# Patient Record
Sex: MALE | Race: WHITE | NOT HISPANIC OR LATINO | Employment: OTHER | URBAN - METROPOLITAN AREA
[De-identification: names, ages, dates, MRNs, and addresses within clinical notes are randomized per-mention and may not be internally consistent; named-entity substitution may affect disease eponyms.]

---

## 2017-02-01 PROCEDURE — 88305 TISSUE EXAM BY PATHOLOGIST: CPT | Performed by: INTERNAL MEDICINE

## 2017-02-02 ENCOUNTER — LAB REQUISITION (OUTPATIENT)
Dept: LAB | Facility: HOSPITAL | Age: 67
End: 2017-02-02
Payer: MEDICARE

## 2017-02-02 DIAGNOSIS — K20.0 EOSINOPHILIC ESOPHAGITIS: ICD-10-CM

## 2018-01-15 ENCOUNTER — ALLSCRIPTS OFFICE VISIT (OUTPATIENT)
Dept: OTHER | Facility: OTHER | Age: 68
End: 2018-01-15

## 2018-01-15 LAB
CLARITY UR: NORMAL
COLOR UR: NORMAL
GLUCOSE (HISTORICAL): NORMAL
HGB UR QL STRIP.AUTO: NORMAL
KETONES UR STRIP-MCNC: NORMAL MG/DL
LEUKOCYTE ESTERASE UR QL STRIP: NORMAL
NITRITE UR QL STRIP: NORMAL
PH UR STRIP.AUTO: 5 [PH]
PROT UR STRIP-MCNC: NORMAL MG/DL

## 2018-02-19 ENCOUNTER — OFFICE VISIT (OUTPATIENT)
Dept: UROLOGY | Facility: CLINIC | Age: 68
End: 2018-02-19
Payer: MEDICARE

## 2018-02-19 DIAGNOSIS — N13.8 BPH WITH OBSTRUCTION/LOWER URINARY TRACT SYMPTOMS: Primary | ICD-10-CM

## 2018-02-19 DIAGNOSIS — N40.1 BPH WITH OBSTRUCTION/LOWER URINARY TRACT SYMPTOMS: Primary | ICD-10-CM

## 2018-02-19 PROCEDURE — 51741 ELECTRO-UROFLOWMETRY FIRST: CPT | Performed by: PHYSICIAN ASSISTANT

## 2018-02-19 PROCEDURE — 51798 US URINE CAPACITY MEASURE: CPT | Performed by: PHYSICIAN ASSISTANT

## 2018-02-19 PROCEDURE — 99212 OFFICE O/P EST SF 10 MIN: CPT | Performed by: PHYSICIAN ASSISTANT

## 2018-02-19 NOTE — PROGRESS NOTES
UROFLOW REVIEW    MAXIMUM FLOW: 32  AVERAGE FLOW:9  VOIDED VOLUME:374  POST VOID RESIDUAL: 54    RECOMMENDATIONS:  Surveillance/ trial of alpha blocker/ cysto and consider Urolift or TURP  Prefers to follow up next year and will consider options

## 2018-05-14 PROCEDURE — 88305 TISSUE EXAM BY PATHOLOGIST: CPT | Performed by: PATHOLOGY

## 2018-05-15 ENCOUNTER — LAB REQUISITION (OUTPATIENT)
Dept: LAB | Facility: HOSPITAL | Age: 68
End: 2018-05-15
Payer: MEDICARE

## 2018-05-15 DIAGNOSIS — R13.19 OTHER DYSPHAGIA: ICD-10-CM

## 2018-05-15 DIAGNOSIS — K22.70 BARRETT'S ESOPHAGUS WITHOUT DYSPLASIA: ICD-10-CM

## 2018-05-15 DIAGNOSIS — K20.0 EOSINOPHILIC ESOPHAGITIS: ICD-10-CM

## 2018-09-05 ENCOUNTER — OFFICE VISIT (OUTPATIENT)
Dept: UROLOGY | Facility: CLINIC | Age: 68
End: 2018-09-05
Payer: MEDICARE

## 2018-09-05 ENCOUNTER — TELEPHONE (OUTPATIENT)
Dept: UROLOGY | Facility: CLINIC | Age: 68
End: 2018-09-05

## 2018-09-05 VITALS
HEART RATE: 50 BPM | BODY MASS INDEX: 27.06 KG/M2 | HEIGHT: 70 IN | DIASTOLIC BLOOD PRESSURE: 68 MMHG | WEIGHT: 189 LBS | SYSTOLIC BLOOD PRESSURE: 118 MMHG

## 2018-09-05 DIAGNOSIS — N13.8 BPH WITH OBSTRUCTION/LOWER URINARY TRACT SYMPTOMS: ICD-10-CM

## 2018-09-05 DIAGNOSIS — R35.0 URINARY FREQUENCY: Primary | ICD-10-CM

## 2018-09-05 DIAGNOSIS — N40.1 BPH WITH OBSTRUCTION/LOWER URINARY TRACT SYMPTOMS: ICD-10-CM

## 2018-09-05 LAB
SL AMB  POCT GLUCOSE, UA: NORMAL
SL AMB LEUKOCYTE ESTERASE,UA: NORMAL
SL AMB POCT BILIRUBIN,UA: NORMAL
SL AMB POCT BLOOD,UA: NORMAL
SL AMB POCT CLARITY,UA: CLEAR
SL AMB POCT COLOR,UA: YELLOW
SL AMB POCT KETONES,UA: NORMAL
SL AMB POCT NITRITE,UA: NORMAL
SL AMB POCT PH,UA: 5
SL AMB POCT SPECIFIC GRAVITY,UA: 1.02
SL AMB POCT URINE PROTEIN: NORMAL
SL AMB POCT UROBILINOGEN: NORMAL

## 2018-09-05 PROCEDURE — 99213 OFFICE O/P EST LOW 20 MIN: CPT | Performed by: PHYSICIAN ASSISTANT

## 2018-09-05 PROCEDURE — 81002 URINALYSIS NONAUTO W/O SCOPE: CPT | Performed by: PHYSICIAN ASSISTANT

## 2018-09-05 RX ORDER — NIACIN 1000 MG/1
1 TABLET, EXTENDED RELEASE ORAL DAILY
COMMUNITY
Start: 2018-07-31 | End: 2019-02-19

## 2018-09-05 RX ORDER — CLINDAMYCIN HYDROCHLORIDE 300 MG/1
CAPSULE ORAL
COMMUNITY
Start: 2015-03-21 | End: 2019-02-19

## 2018-09-05 RX ORDER — DIPHENOXYLATE HYDROCHLORIDE AND ATROPINE SULFATE 2.5; .025 MG/1; MG/1
1 TABLET ORAL
COMMUNITY

## 2018-09-05 RX ORDER — FERROUS SULFATE 325(65) MG
TABLET ORAL
Refills: 2 | COMMUNITY
Start: 2018-06-27 | End: 2019-02-19

## 2018-09-05 RX ORDER — RAMIPRIL 5 MG/1
5 CAPSULE ORAL
COMMUNITY
Start: 2018-08-06

## 2018-09-05 RX ORDER — PINDOLOL 5 MG/1
5 TABLET ORAL
COMMUNITY
Start: 2018-07-22

## 2018-09-05 RX ORDER — MAG HYDROX/ALUMINUM HYD/SIMETH 400-400-40
1 SUSPENSION, ORAL (FINAL DOSE FORM) ORAL
COMMUNITY

## 2018-09-05 RX ORDER — ASPIRIN 81 MG/1
81 TABLET ORAL
COMMUNITY

## 2018-09-05 RX ORDER — OMEPRAZOLE 40 MG/1
40 CAPSULE, DELAYED RELEASE ORAL
COMMUNITY
Start: 2018-06-14

## 2018-09-05 RX ORDER — ALFUZOSIN HYDROCHLORIDE 10 MG/1
10 TABLET, EXTENDED RELEASE ORAL DAILY
Qty: 30 TABLET | Refills: 10 | Status: SHIPPED | OUTPATIENT
Start: 2018-09-05 | End: 2019-01-19

## 2018-09-05 RX ORDER — DIGOXIN 125 UG/1
1 TABLET ORAL
COMMUNITY
Start: 2018-07-22

## 2018-09-05 RX ORDER — ACETAMINOPHEN 500 MG
500 TABLET ORAL EVERY 6 HOURS PRN
COMMUNITY
Start: 2017-11-07

## 2018-09-05 NOTE — PROGRESS NOTES
UROLOGY PROGRESS NOTE   Patient Identifiers: Sesar Winn (MRN [id number])  Date of Service: 9/5/2018    Subjective:     58-year-old male with history of BPH  He had a uroflow in February and options of management were discussed at that time he chose surveillance until his yearly appointment in January  He returns today complaining of urinary frequency  AUA index score is 19  His PVR after his uroflow was 54 mL  He wishes to initiate treatment  Again options of management reviewed he chooses to go on medication at this time and will consider cystoscopy if the alpha blocker is not effective  He has a follow-up appointment in January for his yearly exam     Patient has  no complaints  Objective:     VITALS:    Vitals:    09/05/18 1356   BP: 118/68   Pulse: (!) 50     AUA SYMPTOM SCORE      Most Recent Value   AUA SYMPTOM SCORE   How often have you had a sensation of not emptying your bladder completely after you finished urinating? 2   How often have you had to urinate again less than two hours after you finished urinating? 3   How often have you found you stopped and started again several times when you urinate? 4   How often have you found it difficult to postpone urination? 3   How often have you had a weak urinary stream?  3   How often have you had to push or strain to begin urination? 2   How many times did you most typically get up to urinate from the time you went to bed at night until the time you got up in the morning?   2   Quality of Life: If you were to spend the rest of your life with your urinary condition just the way it is now, how would you feel about that?  3   AUA SYMPTOM SCORE  19            LABS:  No results found for: HGB, HCT, WBC, PLT]    No results found for: NA, K, CL, CO2, BUN, CREATININE, CALCIUM, GLUCOSE]        INPATIENT MEDS:    Current Outpatient Prescriptions:     acetaminophen (TYLENOL) 500 mg tablet, Take 500 mg by mouth every 6 (six) hours, Disp: , Rfl:     aspirin (ECOTRIN LOW STRENGTH) 81 mg EC tablet, Take 81 mg by mouth daily  , Disp: , Rfl:     Cholecalciferol 2000 units CAPS, Take 1 capsule by mouth daily, Disp: , Rfl:     clindamycin (CLEOCIN) 300 MG capsule, Take by mouth 2 before dentist appt, Disp: , Rfl:     COENZYME Q10-RED YEAST RICE PO, Take 1 tablet by mouth daily, Disp: , Rfl:     DIGOX 125 MCG tablet, Take 1 tablet by mouth daily, Disp: , Rfl:     ferrous sulfate 325 (65 Fe) mg tablet, TAKE 1 TABLET BY MOUTH TWICE A DAY 30 MINUTES PRIOR TO LUNCH AND DINNER WITH VIT  C, Disp: , Rfl: 2    fluticasone-salmeterol (ADVAIR DISKUS) 500-50 mcg/dose inhaler, as needed, Disp: , Rfl:     FOLIC ACID PO, Take 473 mcg by mouth, Disp: , Rfl:     multivitamin (THERAGRAN) TABS, Take 1 tablet by mouth, Disp: , Rfl:     niacin (NIASPAN) 1000 MG CR tablet, Take 1 tablet by mouth daily, Disp: , Rfl:     omeprazole (PriLOSEC) 40 MG capsule, 1 po qd, Disp: , Rfl:     pindolol (VISKEN) 5 mg tablet, 1 po qam, Disp: , Rfl:     ramipril (ALTACE) 5 mg capsule, 1 po qhs, Disp: , Rfl:     Saw [place] 450 MG CAPS, Take by mouth 1 po qd, Disp: , Rfl:     alfuzosin (UROXATRAL) 10 mg 24 hr tablet, Take 1 tablet (10 mg total) by mouth daily, Disp: 30 tablet, Rfl: 10      Physical Exam:   /68 (Patient Position: Sitting, Cuff Size: Adult)   Pulse (!) 50   Ht 5' 10" (1 778 m)   Wt 85 7 kg (189 lb)   BMI 27 12 kg/m²   GEN: no acute distress    RESP: breathing comfortably with no accessory muscle use    ABD: soft, non-tender, non-distended   INCISION:    EXT: no significant peripheral edema     RADIOLOGY:     None     Assessment:    1  BPH with obstruction     Plan:   - trial of alfuzosin 10 mg daily  - follow-up for his annual exam in January  - he should call sooner if is medication is not effective or he develops any side effects  - cystoscopy Uroliftor TURP are still on the table

## 2019-01-19 ENCOUNTER — OFFICE VISIT (OUTPATIENT)
Dept: URGENT CARE | Age: 69
End: 2019-01-19
Payer: COMMERCIAL

## 2019-01-19 VITALS
HEART RATE: 55 BPM | RESPIRATION RATE: 18 BRPM | WEIGHT: 189 LBS | OXYGEN SATURATION: 99 % | HEIGHT: 70 IN | BODY MASS INDEX: 27.06 KG/M2 | SYSTOLIC BLOOD PRESSURE: 134 MMHG | DIASTOLIC BLOOD PRESSURE: 77 MMHG | TEMPERATURE: 98.3 F

## 2019-01-19 DIAGNOSIS — J32.9 SINOBRONCHITIS: Primary | ICD-10-CM

## 2019-01-19 DIAGNOSIS — J40 SINOBRONCHITIS: Primary | ICD-10-CM

## 2019-01-19 PROCEDURE — 99213 OFFICE O/P EST LOW 20 MIN: CPT | Performed by: FAMILY MEDICINE

## 2019-01-19 PROCEDURE — S9083 URGENT CARE CENTER GLOBAL: HCPCS | Performed by: FAMILY MEDICINE

## 2019-01-19 RX ORDER — ALFUZOSIN HYDROCHLORIDE 10 MG/1
TABLET, EXTENDED RELEASE ORAL
COMMUNITY
End: 2019-11-17 | Stop reason: SDUPTHER

## 2019-01-19 RX ORDER — DOXYCYCLINE 100 MG/1
100 CAPSULE ORAL 2 TIMES DAILY
Qty: 20 CAPSULE | Refills: 0 | Status: SHIPPED | OUTPATIENT
Start: 2019-01-19 | End: 2019-01-29

## 2019-01-19 RX ORDER — ROSUVASTATIN CALCIUM 5 MG/1
TABLET, COATED ORAL
COMMUNITY

## 2019-01-19 NOTE — PROGRESS NOTES
[facility]    NAME: Tammie Streeter is a 77 y o  male  : 1950    MRN: [id number]  DATE: 2019  TIME: 5:56 PM    Assessment and Plan   Sinobronchitis [J32 9, J40]  1  Sinobronchitis  doxycycline monohydrate (MONODOX) 100 mg capsule       Patient Instructions     Patient Instructions   Take antibiotic as directed  Use your Advair Diskus as directed  Continue nasal saline rinses  Vaporizer in bedroom  Follow up with family doctor if not improving over the next 3-5 days  Chief Complaint     Chief Complaint   Patient presents with    Cough     cold sx, yellow green colored mucous  no fevers or chills  took OTC cough and flu meds  took tylenol this am around 8 am         History of Present Illness   Tammie Streeter presents to the clinic c/o  75-year-old male comes in with increased cough  Started last Thursday with some malaise and then cold symptoms  Now his mucus is yellow and green and sticky  History of pneumonia 2 years ago  Just does not feel well  Mild malaise  Up-to-date with influenza and pneumococcal vaccines  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  Did not wear any respiratory protection  Review of Systems   Review of Systems   Constitutional: Positive for fatigue and fever  Negative for chills  HENT: Positive for congestion, postnasal drip, rhinorrhea and sinus pressure  Eyes: Negative  Respiratory: Positive for cough and chest tightness  Negative for shortness of breath  Cardiovascular: Negative  Hematological: Bruises/bleeds easily         Current Medications     Long-Term Prescriptions   Medication Sig Dispense Refill    aspirin (ECOTRIN LOW STRENGTH) 81 mg EC tablet Take 81 mg by mouth daily        Cholecalciferol 2000 units CAPS Take 1 capsule by mouth daily      DIGOX 125 MCG tablet Take 1 tablet by mouth daily      ferrous sulfate 325 (65 Fe) mg tablet TAKE 1 TABLET BY MOUTH TWICE A DAY 30 MINUTES PRIOR TO LUNCH AND DINNER WITH VIT  C  2    fluticasone-salmeterol (ADVAIR DISKUS) 500-50 mcg/dose inhaler as needed      multivitamin (THERAGRAN) TABS Take 1 tablet by mouth      niacin (NIASPAN) 1000 MG CR tablet Take 1 tablet by mouth daily      omeprazole (PriLOSEC) 40 MG capsule 1 po qd      pindolol (VISKEN) 5 mg tablet 1 po qam      ramipril (ALTACE) 5 mg capsule 1 po qhs      rosuvastatin (CRESTOR) 5 mg tablet TAKE 1 TABLET BY MOUTH EVERYDAY AT BEDTIME         Current Allergies     Allergies as of 01/19/2019 - Reviewed 01/19/2019   Allergen Reaction Noted    Aminobenzoate  10/16/2017    Penicillins Rash 03/21/2015    Shellfish-derived products Rash 01/15/2018          The following portions of the patient's history were reviewed and updated as appropriate: allergies, current medications, past family history, past medical history, past social history, past surgical history and problem list   Past Medical History:   Diagnosis Date    Hypertension     Nocturnal hypoxia     PAT [facility]paroxysmal atrial tachycardia) ([facility] [address] )     Pneumonia 2016    PVC (premature ventricular contraction)      Past Surgical History:   Procedure Laterality Date    CARDIAC SURGERY  1996    CORONARY ARTERY BYPASS GRAFT      FOOT SURGERY Left 2001    TOTAL HIP ARTHROPLASTY Right 11/2017     Family History   Problem Relation Age of Onset    Heart disease Mother     Hypertension Mother    Alejandra Mejia Other Son         Leiomyosarcoma       Objective   /77 (BP Location: Left arm)   Pulse 55   Temp 98 3 °F (36 8 °C) (Oral)   Resp 18   Ht 5' 10" (1 778 m)   Wt 85 7 kg (189 lb)   SpO2 99%   BMI 27 12 kg/m²   No LMP for male patient  Physical Exam     Physical Exam   Constitutional: He is oriented to person, place, and time  He appears well-developed and well-nourished  No distress  HENT:   Head: Normocephalic and atraumatic     Right Ear: External ear normal    Left Ear: External ear normal    Mouth/Throat: No oropharyngeal exudate  Nasal turbinates red and edematous  Deviated nasal septum  Cobblestoning posterior pharynx   Eyes: Pupils are equal, round, and reactive to light  Conjunctivae are normal  Right eye exhibits no discharge  Left eye exhibits no discharge  No scleral icterus  Neck: Normal range of motion  Neck supple  Cardiovascular: Normal rate, regular rhythm and normal heart sounds  Exam reveals no gallop and no friction rub  No murmur heard  Pulmonary/Chest: Effort normal  No respiratory distress  He has no wheezes  He has no rales  Sonorous rhonchi right lung field   Lymphadenopathy:     He has no cervical adenopathy  Neurological: He is alert and oriented to person, place, and time  Skin: Skin is warm and dry  He is not diaphoretic  Psychiatric: He has a normal mood and affect  Nursing note and vitals reviewed

## 2019-01-19 NOTE — PATIENT INSTRUCTIONS
Take antibiotic as directed  Use your Advair Diskus as directed  Continue nasal saline rinses  Vaporizer in bedroom  Follow up with family doctor if not improving over the next 3-5 days

## 2019-01-28 ENCOUNTER — OFFICE VISIT (OUTPATIENT)
Dept: UROLOGY | Facility: CLINIC | Age: 69
End: 2019-01-28
Payer: COMMERCIAL

## 2019-01-28 VITALS
SYSTOLIC BLOOD PRESSURE: 124 MMHG | HEART RATE: 50 BPM | DIASTOLIC BLOOD PRESSURE: 68 MMHG | HEIGHT: 70 IN | BODY MASS INDEX: 27.77 KG/M2 | WEIGHT: 194 LBS

## 2019-01-28 DIAGNOSIS — R35.0 URINARY FREQUENCY: ICD-10-CM

## 2019-01-28 DIAGNOSIS — N13.8 BPH WITH OBSTRUCTION/LOWER URINARY TRACT SYMPTOMS: Primary | ICD-10-CM

## 2019-01-28 DIAGNOSIS — N40.1 BPH WITH OBSTRUCTION/LOWER URINARY TRACT SYMPTOMS: Primary | ICD-10-CM

## 2019-01-28 LAB
SL AMB  POCT GLUCOSE, UA: NORMAL
SL AMB LEUKOCYTE ESTERASE,UA: NORMAL
SL AMB POCT BILIRUBIN,UA: NORMAL
SL AMB POCT BLOOD,UA: NORMAL
SL AMB POCT CLARITY,UA: CLEAR
SL AMB POCT COLOR,UA: YELLOW
SL AMB POCT KETONES,UA: NORMAL
SL AMB POCT NITRITE,UA: NORMAL
SL AMB POCT PH,UA: 5
SL AMB POCT SPECIFIC GRAVITY,UA: 1.01
SL AMB POCT URINE PROTEIN: NORMAL
SL AMB POCT UROBILINOGEN: NORMAL

## 2019-01-28 PROCEDURE — 99213 OFFICE O/P EST LOW 20 MIN: CPT | Performed by: PHYSICIAN ASSISTANT

## 2019-01-28 PROCEDURE — 81002 URINALYSIS NONAUTO W/O SCOPE: CPT | Performed by: PHYSICIAN ASSISTANT

## 2019-01-28 RX ORDER — ALFUZOSIN HYDROCHLORIDE 10 MG/1
10 TABLET, EXTENDED RELEASE ORAL DAILY
Qty: 90 TABLET | Refills: 3 | Status: SHIPPED | OUTPATIENT
Start: 2019-01-28 | End: 2019-02-19

## 2019-02-04 ENCOUNTER — TRANSCRIBE ORDERS (OUTPATIENT)
Dept: ADMINISTRATIVE | Age: 69
End: 2019-02-04

## 2019-02-04 ENCOUNTER — LAB (OUTPATIENT)
Dept: LAB | Age: 69
End: 2019-02-04
Payer: COMMERCIAL

## 2019-02-04 DIAGNOSIS — K42.9 UMBILICAL HERNIA WITHOUT OBSTRUCTION OR GANGRENE: Primary | ICD-10-CM

## 2019-02-04 DIAGNOSIS — K42.9 UMBILICAL HERNIA WITHOUT OBSTRUCTION OR GANGRENE: ICD-10-CM

## 2019-02-04 LAB
ALBUMIN SERPL BCP-MCNC: 3.7 G/DL (ref 3.5–5)
ALP SERPL-CCNC: 82 U/L (ref 46–116)
ALT SERPL W P-5'-P-CCNC: 29 U/L (ref 12–78)
ANION GAP SERPL CALCULATED.3IONS-SCNC: 2 MMOL/L (ref 4–13)
AST SERPL W P-5'-P-CCNC: 17 U/L (ref 5–45)
BASOPHILS # BLD AUTO: 0.02 THOUSANDS/ΜL (ref 0–0.1)
BASOPHILS NFR BLD AUTO: 0 % (ref 0–1)
BILIRUB SERPL-MCNC: 0.47 MG/DL (ref 0.2–1)
BUN SERPL-MCNC: 13 MG/DL (ref 5–25)
CALCIUM SERPL-MCNC: 8.6 MG/DL (ref 8.3–10.1)
CHLORIDE SERPL-SCNC: 105 MMOL/L (ref 100–108)
CO2 SERPL-SCNC: 32 MMOL/L (ref 21–32)
CREAT SERPL-MCNC: 1.04 MG/DL (ref 0.6–1.3)
EOSINOPHIL # BLD AUTO: 0.15 THOUSAND/ΜL (ref 0–0.61)
EOSINOPHIL NFR BLD AUTO: 3 % (ref 0–6)
ERYTHROCYTE [DISTWIDTH] IN BLOOD BY AUTOMATED COUNT: 13.6 % (ref 11.6–15.1)
GFR SERPL CREATININE-BSD FRML MDRD: 72 ML/MIN/1.73SQ M
GLUCOSE SERPL-MCNC: 87 MG/DL (ref 65–140)
HCT VFR BLD AUTO: 42.4 % (ref 36.5–49.3)
HGB BLD-MCNC: 13.7 G/DL (ref 12–17)
IMM GRANULOCYTES # BLD AUTO: 0.01 THOUSAND/UL (ref 0–0.2)
IMM GRANULOCYTES NFR BLD AUTO: 0 % (ref 0–2)
LYMPHOCYTES # BLD AUTO: 1.73 THOUSANDS/ΜL (ref 0.6–4.47)
LYMPHOCYTES NFR BLD AUTO: 30 % (ref 14–44)
MCH RBC QN AUTO: 30.7 PG (ref 26.8–34.3)
MCHC RBC AUTO-ENTMCNC: 32.3 G/DL (ref 31.4–37.4)
MCV RBC AUTO: 95 FL (ref 82–98)
MONOCYTES # BLD AUTO: 0.68 THOUSAND/ΜL (ref 0.17–1.22)
MONOCYTES NFR BLD AUTO: 12 % (ref 4–12)
NEUTROPHILS # BLD AUTO: 3.26 THOUSANDS/ΜL (ref 1.85–7.62)
NEUTS SEG NFR BLD AUTO: 55 % (ref 43–75)
NRBC BLD AUTO-RTO: 0 /100 WBCS
PLATELET # BLD AUTO: 198 THOUSANDS/UL (ref 149–390)
PMV BLD AUTO: 9.5 FL (ref 8.9–12.7)
POTASSIUM SERPL-SCNC: 4.1 MMOL/L (ref 3.5–5.3)
PROT SERPL-MCNC: 7 G/DL (ref 6.4–8.2)
RBC # BLD AUTO: 4.46 MILLION/UL (ref 3.88–5.62)
SODIUM SERPL-SCNC: 139 MMOL/L (ref 136–145)
WBC # BLD AUTO: 5.85 THOUSAND/UL (ref 4.31–10.16)

## 2019-02-04 PROCEDURE — 36415 COLL VENOUS BLD VENIPUNCTURE: CPT

## 2019-02-04 PROCEDURE — 80053 COMPREHEN METABOLIC PANEL: CPT

## 2019-02-04 PROCEDURE — 85025 COMPLETE CBC W/AUTO DIFF WBC: CPT

## 2019-02-04 PROCEDURE — 93005 ELECTROCARDIOGRAM TRACING: CPT

## 2019-02-05 LAB
ATRIAL RATE: 49 BPM
P AXIS: 63 DEGREES
PR INTERVAL: 184 MS
QRS AXIS: 32 DEGREES
QRSD INTERVAL: 88 MS
QT INTERVAL: 444 MS
QTC INTERVAL: 401 MS
T WAVE AXIS: 37 DEGREES
VENTRICULAR RATE: 49 BPM

## 2019-02-05 PROCEDURE — 93010 ELECTROCARDIOGRAM REPORT: CPT | Performed by: INTERNAL MEDICINE

## 2019-02-19 NOTE — PRE-PROCEDURE INSTRUCTIONS
Pre-Surgery Instructions:   Medication Instructions    acetaminophen (TYLENOL) 500 mg tablet Instructed patient per Anesthesia Guidelines   alfuzosin (UROXATRAL) 10 mg 24 hr tablet Instructed patient per Anesthesia Guidelines   aspirin (ECOTRIN LOW STRENGTH) 81 mg EC tablet Patient was instructed by Physician and understands   Cholecalciferol 2000 units CAPS Instructed patient per Anesthesia Guidelines   Coenzyme Q10 (CO Q10 PO) Instructed patient per Anesthesia Guidelines   DIGOX 125 MCG tablet Instructed patient per Anesthesia Guidelines   fluticasone-salmeterol (ADVAIR DISKUS) 500-50 mcg/dose inhaler Instructed patient per Anesthesia Guidelines   FOLIC ACID PO Instructed patient per Anesthesia Guidelines   multivitamin (THERAGRAN) TABS Instructed patient per Anesthesia Guidelines   omeprazole (PriLOSEC) 40 MG capsule Instructed patient per Anesthesia Guidelines   pindolol (VISKEN) 5 mg tablet Instructed patient per Anesthesia Guidelines   ramipril (ALTACE) 5 mg capsule Instructed patient per Anesthesia Guidelines   rosuvastatin (CRESTOR) 5 mg tablet Instructed patient per Anesthesia Guidelines   Saw [place] 450 MG CAPS Instructed patient per Anesthesia Guidelines      Pre op and showering instructions reviewed-Patient has hibiclens

## 2019-02-22 ENCOUNTER — HOSPITAL ENCOUNTER (OUTPATIENT)
Facility: HOSPITAL | Age: 69
Setting detail: OUTPATIENT SURGERY
Discharge: HOME | End: 2019-02-22
Attending: SURGERY | Admitting: SURGERY
Payer: COMMERCIAL

## 2019-02-22 ENCOUNTER — ANESTHESIA (OUTPATIENT)
Dept: PERIOP | Facility: HOSPITAL | Age: 69
End: 2019-02-22
Payer: COMMERCIAL

## 2019-02-22 ENCOUNTER — ANESTHESIA EVENT (OUTPATIENT)
Dept: PERIOP | Facility: HOSPITAL | Age: 69
End: 2019-02-22
Payer: COMMERCIAL

## 2019-02-22 VITALS
OXYGEN SATURATION: 96 % | HEIGHT: 70 IN | TEMPERATURE: 97.4 F | HEART RATE: 54 BPM | SYSTOLIC BLOOD PRESSURE: 155 MMHG | DIASTOLIC BLOOD PRESSURE: 76 MMHG | BODY MASS INDEX: 27.2 KG/M2 | WEIGHT: 190 LBS | RESPIRATION RATE: 18 BRPM

## 2019-02-22 PROCEDURE — C1781 MESH (IMPLANTABLE): HCPCS | Performed by: SURGERY

## 2019-02-22 DEVICE — MODIFIED ONFLEX MESH
Type: IMPLANTABLE DEVICE | Site: INGUINAL | Status: FUNCTIONAL
Brand: MODIFIED ONFLEX MESH

## 2019-02-22 RX ORDER — LIDOCAINE HYDROCHLORIDE 10 MG/ML
INJECTION, SOLUTION INFILTRATION; PERINEURAL AS NEEDED
Status: DISCONTINUED | OUTPATIENT
Start: 2019-02-22 | End: 2019-02-22 | Stop reason: SURG

## 2019-02-22 RX ORDER — CLINDAMYCIN PHOSPHATE 900 MG/50ML
900 INJECTION INTRAVENOUS ONCE
Status: COMPLETED | OUTPATIENT
Start: 2019-02-22 | End: 2019-02-22

## 2019-02-22 RX ORDER — MAGNESIUM HYDROXIDE 1200 MG/15ML
LIQUID ORAL AS NEEDED
Status: DISCONTINUED | OUTPATIENT
Start: 2019-02-22 | End: 2019-02-22 | Stop reason: HOSPADM

## 2019-02-22 RX ORDER — FENTANYL CITRATE 50 UG/ML
INJECTION, SOLUTION INTRAMUSCULAR; INTRAVENOUS AS NEEDED
Status: DISCONTINUED | OUTPATIENT
Start: 2019-02-22 | End: 2019-02-22 | Stop reason: SURG

## 2019-02-22 RX ORDER — MIDAZOLAM HYDROCHLORIDE 1 MG/ML
INJECTION INTRAMUSCULAR; INTRAVENOUS AS NEEDED
Status: DISCONTINUED | OUTPATIENT
Start: 2019-02-22 | End: 2019-02-22 | Stop reason: SURG

## 2019-02-22 RX ORDER — KETOROLAC TROMETHAMINE 30 MG/ML
INJECTION, SOLUTION INTRAMUSCULAR; INTRAVENOUS AS NEEDED
Status: DISCONTINUED | OUTPATIENT
Start: 2019-02-22 | End: 2019-02-22 | Stop reason: SURG

## 2019-02-22 RX ORDER — ONDANSETRON 2 MG/ML
INJECTION INTRAMUSCULAR; INTRAVENOUS AS NEEDED
Status: DISCONTINUED | OUTPATIENT
Start: 2019-02-22 | End: 2019-02-22 | Stop reason: SURG

## 2019-02-22 RX ORDER — FENTANYL CITRATE/PF 50 MCG/ML
25 SYRINGE (ML) INJECTION
Status: DISCONTINUED | OUTPATIENT
Start: 2019-02-22 | End: 2019-02-22 | Stop reason: HOSPADM

## 2019-02-22 RX ORDER — SODIUM CHLORIDE, SODIUM LACTATE, POTASSIUM CHLORIDE, CALCIUM CHLORIDE 600; 310; 30; 20 MG/100ML; MG/100ML; MG/100ML; MG/100ML
50 INJECTION, SOLUTION INTRAVENOUS CONTINUOUS
Status: DISCONTINUED | OUTPATIENT
Start: 2019-02-22 | End: 2019-02-22 | Stop reason: HOSPADM

## 2019-02-22 RX ORDER — ONDANSETRON 2 MG/ML
4 INJECTION INTRAMUSCULAR; INTRAVENOUS ONCE AS NEEDED
Status: DISCONTINUED | OUTPATIENT
Start: 2019-02-22 | End: 2019-02-22 | Stop reason: HOSPADM

## 2019-02-22 RX ORDER — EPHEDRINE SULFATE 50 MG/ML
INJECTION, SOLUTION INTRAVENOUS AS NEEDED
Status: DISCONTINUED | OUTPATIENT
Start: 2019-02-22 | End: 2019-02-22 | Stop reason: SURG

## 2019-02-22 RX ORDER — BUPIVACAINE HYDROCHLORIDE AND EPINEPHRINE 2.5; 5 MG/ML; UG/ML
INJECTION, SOLUTION INFILTRATION; PERINEURAL AS NEEDED
Status: DISCONTINUED | OUTPATIENT
Start: 2019-02-22 | End: 2019-02-22 | Stop reason: HOSPADM

## 2019-02-22 RX ORDER — SODIUM CHLORIDE 9 MG/ML
INJECTION, SOLUTION INTRAVENOUS CONTINUOUS PRN
Status: DISCONTINUED | OUTPATIENT
Start: 2019-02-22 | End: 2019-02-22 | Stop reason: SURG

## 2019-02-22 RX ORDER — PROPOFOL 10 MG/ML
INJECTION, EMULSION INTRAVENOUS AS NEEDED
Status: DISCONTINUED | OUTPATIENT
Start: 2019-02-22 | End: 2019-02-22 | Stop reason: SURG

## 2019-02-22 RX ADMIN — SODIUM CHLORIDE: 0.9 INJECTION, SOLUTION INTRAVENOUS at 15:08

## 2019-02-22 RX ADMIN — FENTANYL CITRATE 50 MCG: 50 INJECTION INTRAMUSCULAR; INTRAVENOUS at 15:19

## 2019-02-22 RX ADMIN — MIDAZOLAM HYDROCHLORIDE 1 MG: 1 INJECTION, SOLUTION INTRAMUSCULAR; INTRAVENOUS at 15:10

## 2019-02-22 RX ADMIN — FENTANYL CITRATE 25 MCG: 50 INJECTION INTRAMUSCULAR; INTRAVENOUS at 15:25

## 2019-02-22 RX ADMIN — LIDOCAINE HYDROCHLORIDE ANHYDROUS 50 MG: 10 INJECTION, SOLUTION INFILTRATION at 15:12

## 2019-02-22 RX ADMIN — KETOROLAC TROMETHAMINE 15 MG: 30 INJECTION, SOLUTION INTRAMUSCULAR at 15:59

## 2019-02-22 RX ADMIN — EPHEDRINE SULFATE 5 MG: 50 INJECTION, SOLUTION INTRAMUSCULAR; INTRAVENOUS; SUBCUTANEOUS at 15:33

## 2019-02-22 RX ADMIN — CLINDAMYCIN IN 5 PERCENT DEXTROSE 900 MG: 18 INJECTION, SOLUTION INTRAVENOUS at 14:55

## 2019-02-22 RX ADMIN — MIDAZOLAM HYDROCHLORIDE 1 MG: 1 INJECTION, SOLUTION INTRAMUSCULAR; INTRAVENOUS at 15:07

## 2019-02-22 RX ADMIN — EPHEDRINE SULFATE 5 MG: 50 INJECTION, SOLUTION INTRAMUSCULAR; INTRAVENOUS; SUBCUTANEOUS at 15:49

## 2019-02-22 RX ADMIN — ONDANSETRON 4 MG: 2 INJECTION INTRAMUSCULAR; INTRAVENOUS at 15:21

## 2019-02-22 RX ADMIN — FENTANYL CITRATE 25 MCG: 50 INJECTION INTRAMUSCULAR; INTRAVENOUS at 15:12

## 2019-02-22 RX ADMIN — PROPOFOL 200 MG: 10 INJECTION, EMULSION INTRAVENOUS at 15:12

## 2019-02-22 RX ADMIN — DEXAMETHASONE SODIUM PHOSPHATE 4 MG: 10 INJECTION INTRAMUSCULAR; INTRAVENOUS at 15:21

## 2019-02-22 RX ADMIN — EPHEDRINE SULFATE 5 MG: 50 INJECTION, SOLUTION INTRAMUSCULAR; INTRAVENOUS; SUBCUTANEOUS at 15:35

## 2019-02-22 NOTE — ANESTHESIA POSTPROCEDURE EVALUATION
Post-Op Assessment Note    CV Status:  Stable  Pain Score: 0    Pain management: adequate     Mental Status:  Alert and awake   Hydration Status:  Euvolemic   PONV Controlled:  Controlled   Airway Patency:  Patent   Post Op Vitals Reviewed: Yes      Staff: CRNA, Anesthesiologist           BP (P) 138/68 (02/22/19 1608)    Temp (P) 98 3 °F (36 8 °C) (02/22/19 1608)    Pulse (P) 63 (02/22/19 1608)   Resp (P) 18 (02/22/19 1608)    SpO2 (P) 98 % (02/22/19 1608)

## 2019-02-22 NOTE — ANESTHESIA PREPROCEDURE EVALUATION
Review of Systems/Medical History  Patient summary reviewed    No history of anesthetic complications     Cardiovascular  EKG reviewed, Exercise tolerance (METS): >4,  Hyperlipidemia, Hypertension controlled, History of CABG,    Pulmonary  Pneumonia,        GI/Hepatic    GERD ,        Negative  ROS        Endo/Other  Negative endo/other ROS      GYN       Hematology  Negative hematology ROS      Musculoskeletal  Negative musculoskeletal ROS        Neurology  Negative neurology ROS      Psychology   Negative psychology ROS              Physical Exam    Airway    Mallampati score: I  TM Distance: >3 FB  Neck ROM: full     Dental   Comment: No loose,     Cardiovascular  Rhythm: regular, Rate: normal,     Pulmonary  Breath sounds clear to auscultation,     Other Findings        Anesthesia Plan  ASA Score- 2     Anesthesia Type- general with ASA Monitors  Additional Monitors:   Airway Plan: LMA  Plan Factors-  Patient did not smoke on day of surgery  Induction-     Postoperative Plan- Plan for postoperative opioid use  Planned trial extubation    Informed Consent- Anesthetic plan and risks discussed with patient  I personally reviewed this patient with the CRNA  Discussed and agreed on the Anesthesia Plan with the CRNA  Sherrie Carroll

## 2019-02-22 NOTE — DISCHARGE INSTRUCTIONS
Diet and activity as tolerated  May shower  May drive when not taking pain medication  Please call [phone] for a follow-up office visit for 2 weeks  Please consider milk a magnesia daily in order to help prevent constipation

## 2019-02-22 NOTE — OP NOTE
OPERATIVE REPORT  PATIENT NAME: Kelly George    :  1950  MRN: [id number]  Pt Location: [facility]    SURGERY DATE: 2019    Surgeon(s) and Role:     * Dalia Rogers MD - Primary     * Nabila Becerra MD - Assisting    Preop Diagnosis:  Unilateral inguinal hernia without obstruction or gangrene [K40 90]    Post-Op Diagnosis Codes:     * Unilateral inguinal hernia without obstruction or gangrene [K40 90]    Procedure(s) (LRB):  REPAIR HERNIA INGUINAL (Left)    Specimen(s):  * No specimens in log *    Estimated Blood Loss:   Minimal    Drains:  * No LDAs found *    Anesthesia Type:   General    Operative Indications:  Unilateral inguinal hernia without obstruction or gangrene [K40 90]      Operative Findings:  Independent, nonsmoker, ASA 2, wound class 1, BMI 27, weight 190, height 70    Cardiovascular  EKG reviewed, Exercise tolerance (METS): >4,  Hyperlipidemia, Hypertension controlled, History of CABG,     Pulmonary  Pneumonia,          GI/Hepatic     GERD ,          Negative  ROS          Endo/Other  Negative endo/other ROS        GYN         Hematology  Negative hematology ROS        Musculoskeletal  Negative musculoskeletal ROS          Neurology  Negative neurology ROS        Psychology   Negative psychology ROS                           Complications:   None    Procedure and Technique:  Patient was identified visually and via armband  Placed in the supine position  After general anesthesia the abdomen was prepped and draped in a sterile fashion  0 25% Marcaine with epinephrine was utilized throughout the procedure  Incision was made in the left inguinal region  This carried down through skin subcutaneous tissue  Superficial epigastric vein was clamped divided and ligated  External oblique fibers were divided  Self-retaining retractor was then placed  A large indirect inguinal herniation was present  High dissection was completed    A Penrose drain was then utilized to encircle the cord structures  These were preserved during dissection  Polypropylene mesh was then inserted into the pre peritoneum  This on a placed interrupted figure-of-eight 1  Vicryl suture  [place] mesh was also secured  The wound was then closed with a running 3 0 PDS for external oblique followed by 3 0 Vicryl subcutaneous and 4 Monocryl sutures  Dermabond was applied  Patient tolerated this procedure well  Sponge instrument count correct     I was present for the entire procedure    Patient Disposition:  PACU     SIGNATURE: Karma Jenkins MD  DATE: February 22, 2019  TIME: 4:05 PM

## 2019-03-26 ENCOUNTER — ANESTHESIA EVENT (OUTPATIENT)
Dept: GASTROENTEROLOGY | Facility: HOSPITAL | Age: 69
End: 2019-03-26
Payer: COMMERCIAL

## 2019-03-27 ENCOUNTER — ANESTHESIA (OUTPATIENT)
Dept: GASTROENTEROLOGY | Facility: HOSPITAL | Age: 69
End: 2019-03-27
Payer: COMMERCIAL

## 2019-03-27 ENCOUNTER — HOSPITAL ENCOUNTER (OUTPATIENT)
Facility: HOSPITAL | Age: 69
Setting detail: OUTPATIENT SURGERY
Discharge: HOME | End: 2019-03-27
Attending: INTERNAL MEDICINE | Admitting: INTERNAL MEDICINE
Payer: COMMERCIAL

## 2019-03-27 VITALS
DIASTOLIC BLOOD PRESSURE: 61 MMHG | HEART RATE: 64 BPM | TEMPERATURE: 97.7 F | WEIGHT: 190 LBS | BODY MASS INDEX: 27.2 KG/M2 | SYSTOLIC BLOOD PRESSURE: 127 MMHG | RESPIRATION RATE: 18 BRPM | OXYGEN SATURATION: 98 % | HEIGHT: 70 IN

## 2019-03-27 DIAGNOSIS — Z86.010 HISTORY OF COLONIC POLYPS: ICD-10-CM

## 2019-03-27 PROCEDURE — 88305 TISSUE EXAM BY PATHOLOGIST: CPT | Performed by: PATHOLOGY

## 2019-03-27 RX ORDER — SODIUM CHLORIDE 9 MG/ML
INJECTION, SOLUTION INTRAVENOUS CONTINUOUS PRN
Status: DISCONTINUED | OUTPATIENT
Start: 2019-03-27 | End: 2019-03-27 | Stop reason: SURG

## 2019-03-27 RX ORDER — PROPOFOL 10 MG/ML
INJECTION, EMULSION INTRAVENOUS CONTINUOUS PRN
Status: DISCONTINUED | OUTPATIENT
Start: 2019-03-27 | End: 2019-03-27 | Stop reason: SURG

## 2019-03-27 RX ORDER — PROPOFOL 10 MG/ML
INJECTION, EMULSION INTRAVENOUS AS NEEDED
Status: DISCONTINUED | OUTPATIENT
Start: 2019-03-27 | End: 2019-03-27 | Stop reason: SURG

## 2019-03-27 RX ADMIN — PROPOFOL 20 MG: 10 INJECTION, EMULSION INTRAVENOUS at 14:17

## 2019-03-27 RX ADMIN — PROPOFOL 20 MG: 10 INJECTION, EMULSION INTRAVENOUS at 14:08

## 2019-03-27 RX ADMIN — PROPOFOL 40 MG: 10 INJECTION, EMULSION INTRAVENOUS at 14:16

## 2019-03-27 RX ADMIN — PROPOFOL 20 MG: 10 INJECTION, EMULSION INTRAVENOUS at 14:03

## 2019-03-27 RX ADMIN — LIDOCAINE HYDROCHLORIDE 50 MG: 20 INJECTION, SOLUTION INTRAVENOUS at 13:58

## 2019-03-27 RX ADMIN — PROPOFOL 120 MCG/KG/MIN: 10 INJECTION, EMULSION INTRAVENOUS at 14:01

## 2019-03-27 RX ADMIN — PROPOFOL 20 MG: 10 INJECTION, EMULSION INTRAVENOUS at 14:13

## 2019-03-27 RX ADMIN — PROPOFOL 100 MG: 10 INJECTION, EMULSION INTRAVENOUS at 14:01

## 2019-03-27 RX ADMIN — SODIUM CHLORIDE: 0.9 INJECTION, SOLUTION INTRAVENOUS at 13:56

## 2019-03-27 RX ADMIN — PROPOFOL 40 MG: 10 INJECTION, EMULSION INTRAVENOUS at 14:22

## 2019-03-27 RX ADMIN — LIDOCAINE HYDROCHLORIDE 50 MG: 20 INJECTION, SOLUTION INTRAVENOUS at 14:01

## 2019-03-27 NOTE — ANESTHESIA PREPROCEDURE EVALUATION
Review of Systems/Medical History  Patient summary reviewed    No history of anesthetic complications     Cardiovascular  EKG reviewed, Exercise tolerance (METS): >4,  Hyperlipidemia, Hypertension controlled, History of CABG,    Pulmonary  Pneumonia,        GI/Hepatic    GERD ,        Negative  ROS        Endo/Other  Negative endo/other ROS      GYN       Hematology  Negative hematology ROS      Musculoskeletal  Negative musculoskeletal ROS        Neurology  Negative neurology ROS      Psychology   Negative psychology ROS              Physical Exam    Airway    Mallampati score: I  TM Distance: >3 FB  Neck ROM: full     Dental   Comment: No loose,     Cardiovascular  Rhythm: regular, Rate: normal,     Pulmonary  Breath sounds clear to auscultation,     Other Findings        Anesthesia Plan  ASA Score- 2     Anesthesia Type- IV sedation with anesthesia with ASA Monitors  Additional Monitors:   Airway Plan:         Plan Factors-Patient not instructed to abstain from smoking on day of procedure  Patient did not smoke on day of surgery  Induction-     Postoperative Plan-   Planned trial extubation    Informed Consent- Anesthetic plan and risks discussed with patient  I personally reviewed this patient with the CRNA  Discussed and agreed on the Anesthesia Plan with the ISABEL Sierra

## 2019-03-27 NOTE — H&P
History and Physical -  [facility]  Guerrero Sanders 77 y o  male MRN: [id number]                  HPI: Guerrero Sanders is a 77y o  year old male who presents for colonoscopy, he has history of multiple colonic adenomas  The last colonoscopy was about 3 years ago  REVIEW OF SYSTEMS: Per the HPI, and otherwise unremarkable      Historical Information   Past Medical History:   Diagnosis Date    Cancer ([facility] [address] )     skin - squamos cell    GERD (gastroesophageal reflux disease)     Hearing aid worn     Bilateral    Hyperlipidemia     Hypertension     Nocturnal hypoxia     Use 2LNC Concentrated O2 at Night only    PAT (paroxysmal atrial tachycardia) (HCC)     Pneumonia 2016    PVC (premature ventricular contraction)      Past Surgical History:   Procedure Laterality Date    CARDIAC SURGERY  1996    CATARACT EXTRACTION      COLONOSCOPY      CORONARY ARTERY BYPASS GRAFT      CYST REMOVAL      Chest    FOOT SURGERY Left 2001    KNEE SURGERY Left     as a child    [place] REPAIR [address] HERNIA,5+Y/O,REDUCIBL Left 2/22/2019    Procedure: REPAIR HERNIA INGUINAL;  Surgeon: Nicole Stubbs MD;  Location: [facility];  Service: General    TONSILLECTOMY      TOTAL HIP ARTHROPLASTY Right 11/2017     Social History   Social History     Substance and Sexual Activity   Alcohol Use No     Social History     Substance and Sexual Activity   Drug Use No     Social History     Tobacco Use   Smoking Status Never Smoker   Smokeless Tobacco Never Used     Family History   Problem Relation Age of Onset    Heart disease Mother     Hypertension Mother     Other Son         Leiomyosarcoma       Meds/Allergies     Medications Prior to Admission   Medication    alfuzosin (UROXATRAL) 10 mg 24 hr tablet    Cholecalciferol 2000 units CAPS    Coenzyme Q10 (CO Q10 PO)    DIGOX 125 MCG tablet    fluticasone-salmeterol (ADVAIR DISKUS) 500-50 mcg/dose inhaler    FOLIC ACID PO    multivitamin (THERAGRAN) TABS    omeprazole (PriLOSEC) 40 MG capsule    pindolol (VISKEN) 5 mg tablet    ramipril (ALTACE) 5 mg capsule    rosuvastatin (CRESTOR) 5 mg tablet    Saw [place] 450 MG CAPS    acetaminophen (TYLENOL) 500 mg tablet    aspirin (ECOTRIN LOW STRENGTH) 81 mg EC tablet    OXYGEN-HELIUM IN       Allergies   Allergen Reactions    Penicillins Rash    Shellfish-Derived Products Other (See Comments)     Unknown       Objective     Blood pressure 136/58, pulse 56, temperature 97 7 °F (36 5 °C), temperature source Oral, resp  rate 13, height 5' 10" (1 778 m), weight 86 2 kg (190 lb), SpO2 100 %  PHYSICAL EXAM    Gen: NAD  CV: RRR  CHEST: Clear  ABD: soft, NT/ND  EXT: no edema      ASSESSMENT/PLAN:  This is a 77y o  year old male here for colonoscopy, and he is stable and optimized for his procedure

## 2019-03-27 NOTE — OP NOTE
OPERATIVE REPORT  PATIENT NAME: Cory Escalera    :  1950  MRN: [id number]  Pt Location: [facility] ROOM 01    SURGERY DATE: 3/27/2019    Surgeon(s) and Role:     Cullen Lopez MD - Primary    Preop Diagnosis:  History of colonic polyps [Z86 010]    Post-Op Diagnosis Codes:     * History of colonic polyps [Z86 010]    Procedure(s) (LRB):  COLONOSCOPY (N/A)    Specimen(s):  ID Type Source Tests Collected by Time Destination   1 : descending; cold forcep Tissue Polyp, Colorectal TISSUE EXAM Cullen Lopez MD 3/27/2019 1417        Anesthesia Type:   IV Sedation with Anesthesia    Operative Indications:  History of colonic polyps [Z86 010]      Operative Findings:  Rectal exam:  The prostate was enlarged without nodules  Otherwise normal rectal exam   Colon:  There was a single diminutive polyp in the descending colon removed with cold biopsy forceps  There were several diverticuli in the sigmoid and in the distal distending colon  Otherwise the colon was normal      Complications:   no complications    Procedure and Technique:  Written consent was obtained  The colonoscope was easily advanced into the cecum identified by the ileocecal valve and appendiceal orifice  The views were excellent  The quality of prep was excellent  Patient's toleration of the procedure was excellent  Patient Disposition:   Contact Dr Peewee Villalobos [phone] with questions or concerns  Call in 1 week to check for the biopsy results  Follow up with Dr Kyle Mckinney  Follow-up colonoscopy in 5 years          SIGNATURE: Cullen Lopez MD  DATE: 2019  TIME: 2:24 PM

## 2019-03-27 NOTE — ANESTHESIA POSTPROCEDURE EVALUATION
Post-Op Assessment Note    CV Status:  Stable  Pain Score: 0    Pain management: adequate     Mental Status:  Alert and awake   Hydration Status:  Euvolemic   PONV Controlled:  Controlled   Airway Patency:  Patent   Post Op Vitals Reviewed: Yes      Staff: CRNA           /58 (03/27/19 1430)    Temp      Pulse 56 (03/27/19 1430)   Resp 16 (03/27/19 1430)    SpO2 100 % (03/27/19 1430)

## 2019-06-12 NOTE — PROGRESS NOTES
UROLOGY PROGRESS NOTE   Patient Identifiers: Yuliya Escalona (MRN [id number])  Date of Service: 1/28/2019    Subjective:     64-year-old man history of BPH  He went on alfuzosin a few months ago and is doing much better  Urine is clear  AUA index score is 15  He has no side effects from medication  And is generally satisfied  Current PSA is 1 5  Patient has  no complaints  Objective:     VITALS:    Vitals:    01/28/19 1317   BP: 124/68   Pulse: (!) 50     AUA SYMPTOM SCORE      Most Recent Value   AUA SYMPTOM SCORE   How often have you had a sensation of not emptying your bladder completely after you finished urinating? 2   How often have you had to urinate again less than two hours after you finished urinating? 2   How often have you found you stopped and started again several times when you urinate? 3   How often have you found it difficult to postpone urination? 2   How often have you had a weak urinary stream?  2   How often have you had to push or strain to begin urination? 2   How many times did you most typically get up to urinate from the time you went to bed at night until the time you got up in the morning?   2   Quality of Life: If you were to spend the rest of your life with your urinary condition just the way it is now, how would you feel about that?  1   AUA SYMPTOM SCORE  15            LABS:  No results found for: HGB, HCT, WBC, PLT]    No results found for: NA, K, CL, CO2, BUN, CREATININE, CALCIUM, GLUCOSE]        INPATIENT MEDS:    Current Outpatient Prescriptions:     acetaminophen (TYLENOL) 500 mg tablet, Take 500 mg by mouth every 6 (six) hours, Disp: , Rfl:     alfuzosin (UROXATRAL) 10 mg 24 hr tablet, TAKE 1 TABLET BY MOUTH EVERY DAY, Disp: , Rfl:     aspirin (ECOTRIN LOW STRENGTH) 81 mg EC tablet, Take 81 mg by mouth daily  , Disp: , Rfl:     Cholecalciferol 2000 units CAPS, Take 1 capsule by mouth daily, Disp: , Rfl:     clindamycin (CLEOCIN) 300 MG capsule, Take by mouth 2 before dentist appt, Disp: , Rfl:     COENZYME Q10-RED YEAST RICE PO, Take 1 tablet by mouth daily, Disp: , Rfl:     DIGOX 125 MCG tablet, Take 1 tablet by mouth daily, Disp: , Rfl:     doxycycline monohydrate (MONODOX) 100 mg capsule, Take 1 capsule (100 mg total) by mouth 2 (two) times a day for 10 days, Disp: 20 capsule, Rfl: 0    ferrous sulfate 325 (65 Fe) mg tablet, TAKE 1 TABLET BY MOUTH TWICE A DAY 30 MINUTES PRIOR TO LUNCH AND DINNER WITH VIT  C, Disp: , Rfl: 2    fluticasone-salmeterol (ADVAIR DISKUS) 500-50 mcg/dose inhaler, as needed, Disp: , Rfl:     FOLIC ACID PO, Take 023 mcg by mouth, Disp: , Rfl:     multivitamin (THERAGRAN) TABS, Take 1 tablet by mouth, Disp: , Rfl:     omeprazole (PriLOSEC) 40 MG capsule, 1 po qd, Disp: , Rfl:     OXYGEN-HELIUM IN, Inhale, Disp: , Rfl:     pindolol (VISKEN) 5 mg tablet, 1 po qam, Disp: , Rfl:     ramipril (ALTACE) 5 mg capsule, 1 po qhs, Disp: , Rfl:     rosuvastatin (CRESTOR) 5 mg tablet, TAKE 1 TABLET BY MOUTH EVERYDAY AT BEDTIME, Disp: , Rfl:     Saw [place] 450 MG CAPS, Take by mouth 1 po qd, Disp: , Rfl:     alfuzosin (UROXATRAL) 10 mg 24 hr tablet, Take 1 tablet (10 mg total) by mouth daily, Disp: 90 tablet, Rfl: 3    niacin (NIASPAN) 1000 MG CR tablet, Take 1 tablet by mouth daily, Disp: , Rfl:       Physical Exam:   /68 (BP Location: Right arm, Patient Position: Sitting, Cuff Size: Adult)   Pulse (!) 50   Ht 5' 10" (1 778 m)   Wt 88 kg (194 lb)   BMI 27 84 kg/m²   GEN: no acute distress    RESP: breathing comfortably with no accessory muscle use    ABD: soft, non-tender, non-distended   INCISION:    EXT: no significant peripheral edema   (Male): Penis circumcised, phallus normal, meatus patent  Testicles descended into scrotum bilaterally without masses nor tenderness  No inguinal hernias bilaterally  STAN: Prostate is enlarged at 45 grams  The prostate is not boggy  The prostate is not tender    No nodules noted      RADIOLOGY:     None     Assessment:    1   BPH with obstruction     Plan:   - follow-up in 1 year PSA prior to visit  - continue current medication 90 day supply of alfuzosin sent to [facility]  -  - Meals and Snack/Nutrition Education/Collaboration and Referral of Nutrition Care Nutrition Education/Meals and Snack/Collaboration and Referral of Nutrition Care

## 2019-10-14 ENCOUNTER — OFFICE VISIT (OUTPATIENT)
Dept: PODIATRY | Facility: CLINIC | Age: 69
End: 2019-10-14
Payer: COMMERCIAL

## 2019-10-14 VITALS
WEIGHT: 194.6 LBS | HEART RATE: 68 BPM | SYSTOLIC BLOOD PRESSURE: 138 MMHG | BODY MASS INDEX: 27.86 KG/M2 | DIASTOLIC BLOOD PRESSURE: 72 MMHG | HEIGHT: 70 IN

## 2019-10-14 DIAGNOSIS — M72.2 PLANTAR FASCIITIS: Primary | ICD-10-CM

## 2019-10-14 DIAGNOSIS — M79.671 RIGHT FOOT PAIN: ICD-10-CM

## 2019-10-14 PROCEDURE — 20550 NJX 1 TENDON SHEATH/LIGAMENT: CPT | Performed by: PODIATRIST

## 2019-10-14 PROCEDURE — 99203 OFFICE O/P NEW LOW 30 MIN: CPT | Performed by: PODIATRIST

## 2019-10-14 RX ORDER — TRIAMCINOLONE ACETONIDE 40 MG/ML
20 INJECTION, SUSPENSION INTRA-ARTICULAR; INTRAMUSCULAR ONCE
Status: COMPLETED | OUTPATIENT
Start: 2019-10-14 | End: 2019-10-14

## 2019-10-14 RX ORDER — BUPIVACAINE HYDROCHLORIDE 5 MG/ML
1 INJECTION, SOLUTION EPIDURAL; INTRACAUDAL ONCE
Status: COMPLETED | OUTPATIENT
Start: 2019-10-14 | End: 2019-10-14

## 2019-10-14 RX ORDER — LIDOCAINE HYDROCHLORIDE 10 MG/ML
1 INJECTION, SOLUTION INFILTRATION; PERINEURAL ONCE
Status: COMPLETED | OUTPATIENT
Start: 2019-10-14 | End: 2019-10-14

## 2019-10-14 RX ADMIN — LIDOCAINE HYDROCHLORIDE 1 ML: 10 INJECTION, SOLUTION INFILTRATION; PERINEURAL at 09:31

## 2019-10-14 RX ADMIN — BUPIVACAINE HYDROCHLORIDE 1 ML: 5 INJECTION, SOLUTION EPIDURAL; INTRACAUDAL at 09:32

## 2019-10-14 RX ADMIN — TRIAMCINOLONE ACETONIDE 20 MG: 40 INJECTION, SUSPENSION INTRA-ARTICULAR; INTRAMUSCULAR at 09:31

## 2019-10-14 NOTE — PROGRESS NOTES
Assessment/Plan:    Explained to patient that his symptoms are consistent with plantar fasciitis of the right foot  Discussed treatment options in detail  Dispensed heel supports  Recommended stretching exercises  Injected right heel with 0 5 cc Kenalog 40 along with 1 cc 1% xylocaine and 1 cc 0 5% Marcaine  Attempting to avoid oral anti-inflammatories due to cardiac history  Foot injection     Date/Time 10/14/2019 9:38 AM     Performed by  Monica Fernandez DPM     Authorized by Monica Fernandez DPM      Universal Protocol Consent: Verbal consent obtained  Risks and benefits: risks, benefits and alternatives were discussed  Consent given by: patient  Patient understanding: patient states understanding of the procedure being performed        Preparation: Patient was prepped and draped in the usual sterile fashion  Site preparation: Isopropyl alcohol    Local anesthesia used: yes     Anesthesia   Local anesthesia used: yes  Local Anesthetic: lidocaine 1% without epinephrine and bupivacaine 0 5% without epinephrine     Procedure Details   Procedure Notes: Injected right heel with 0 5 cc Kenalog 40 along with 1 cc 1% xylocaine and 1 cc 0 5% Marcaine  No problem-specific Assessment & Plan notes found for this encounter  Diagnoses and all orders for this visit:    Plantar fasciitis  -     bupivacaine (PF) (MARCAINE) 0 5 % injection 1 mL  -     lidocaine (XYLOCAINE) 1 % injection 1 mL  -     triamcinolone acetonide (KENALOG-40) 40 mg/mL injection 20 mg    Right foot pain          Subjective:      Patient ID: Miguelito Jackson is a 77 y o  male  HPI     Patient, a 55-year-old male in apparent good health presents with right heel pain  Patient has been in pain for approximately 3-4 months  Pain began with no recalled trauma  Symptoms of post static dyskinesia related  No left heel pain is present  Patient has known high arches  This is the 1st time that the heel has been painful    Patient has a large retrocalcaneal exostosis on the right heel but it is not painful  The following portions of the patient's history were reviewed and updated as appropriate: allergies, current medications, past family history, past medical history, past social history, past surgical history and problem list     Review of Systems   HENT: Positive for hearing loss  Cardiovascular:        History of cardiovascular disease necessitating bypass   Neurological: Negative  Hematological: Negative  Objective:      /72   Pulse 68   Ht 5' 10" (1 778 m)   Wt 88 3 kg (194 lb 9 6 oz)   BMI 27 92 kg/m²          Physical Exam   Constitutional: He is oriented to person, place, and time  He appears well-developed and well-nourished  Cardiovascular: Regular rhythm and intact distal pulses  Temperature and turgor within normal limits bilateral   Musculoskeletal: He exhibits tenderness and deformity  Pain with palpation central aspect right heel at fascia insertion into the calcaneus  Pes cavus foot type  Retrocalcaneal exostosis present right foot but asymptomatic  Neurological: He is alert and oriented to person, place, and time  No sensory deficit  He exhibits normal muscle tone  Tinel sign negative at right posterior tibial nerve  Skin: Skin is warm  Capillary refill takes 2 to 3 seconds  No rash noted  No erythema

## 2019-11-11 ENCOUNTER — OFFICE VISIT (OUTPATIENT)
Dept: PODIATRY | Facility: CLINIC | Age: 69
End: 2019-11-11
Payer: COMMERCIAL

## 2019-11-11 VITALS
DIASTOLIC BLOOD PRESSURE: 57 MMHG | WEIGHT: 196.2 LBS | SYSTOLIC BLOOD PRESSURE: 124 MMHG | HEIGHT: 70 IN | HEART RATE: 69 BPM | BODY MASS INDEX: 28.09 KG/M2

## 2019-11-11 DIAGNOSIS — M72.2 PLANTAR FASCIITIS: Primary | ICD-10-CM

## 2019-11-11 PROCEDURE — 20550 NJX 1 TENDON SHEATH/LIGAMENT: CPT | Performed by: PODIATRIST

## 2019-11-11 RX ORDER — BUPIVACAINE HYDROCHLORIDE 5 MG/ML
1 INJECTION, SOLUTION EPIDURAL; INTRACAUDAL ONCE
Status: COMPLETED | OUTPATIENT
Start: 2019-11-11 | End: 2019-11-11

## 2019-11-11 RX ORDER — CYANOCOBALAMIN (VITAMIN B-12) 500 MCG
TABLET ORAL
COMMUNITY
End: 2019-11-11 | Stop reason: SDUPTHER

## 2019-11-11 RX ORDER — TRIAMCINOLONE ACETONIDE 40 MG/ML
20 INJECTION, SUSPENSION INTRA-ARTICULAR; INTRAMUSCULAR ONCE
Status: COMPLETED | OUTPATIENT
Start: 2019-11-11 | End: 2019-11-11

## 2019-11-11 RX ORDER — BILBERRY FRUIT 1000 MG
CAPSULE ORAL
COMMUNITY
End: 2019-11-11 | Stop reason: SDUPTHER

## 2019-11-11 RX ORDER — LIDOCAINE HYDROCHLORIDE 10 MG/ML
1 INJECTION, SOLUTION INFILTRATION; PERINEURAL ONCE
Status: COMPLETED | OUTPATIENT
Start: 2019-11-11 | End: 2019-11-11

## 2019-11-11 RX ORDER — CHOLECALCIFEROL (VITAMIN D3) 1250 MCG
CAPSULE ORAL
COMMUNITY
End: 2019-11-11 | Stop reason: SDUPTHER

## 2019-11-11 RX ADMIN — BUPIVACAINE HYDROCHLORIDE 1 ML: 5 INJECTION, SOLUTION EPIDURAL; INTRACAUDAL at 09:36

## 2019-11-11 RX ADMIN — LIDOCAINE HYDROCHLORIDE 1 ML: 10 INJECTION, SOLUTION INFILTRATION; PERINEURAL at 09:33

## 2019-11-11 RX ADMIN — TRIAMCINOLONE ACETONIDE 20 MG: 40 INJECTION, SUSPENSION INTRA-ARTICULAR; INTRAMUSCULAR at 09:35

## 2019-11-11 NOTE — PROGRESS NOTES
Patient presents for assessment of right heel  Patient was given a cortisone injection for plantar fasciitis approximately 3 weeks ago  There has been improvement but patient still has discomfort  Another injection is desired  Treatment consisted of reinjecting the right heel with 0 5 cc Kenalog 40 along with 1 cc 1% xylocaine and 1 cc 0 5% Marcaine  This is his 2nd injection  Patient is rescheduled in 4 weeks  Foot injection     Date/Time 11/11/2019 9:37 AM     Performed by  Tate Delcid DPM     Authorized by Tate Delcid DPM      Universal Protocol Consent: Verbal consent obtained  Risks and benefits: risks, benefits and alternatives were discussed  Consent given by: patient  Patient understanding: patient states understanding of the procedure being performed        Site preparation: Isopropyl alcohol   Procedure Details   Procedure Notes: Injected right heel with 0 5 cc Kenalog 40 along with 1 cc 1% xylocaine and 1 cc 0 5% Marcaine

## 2019-11-15 ENCOUNTER — TRANSCRIBE ORDERS (OUTPATIENT)
Dept: ADMINISTRATIVE | Age: 69
End: 2019-11-15

## 2019-11-15 ENCOUNTER — LAB (OUTPATIENT)
Dept: LAB | Age: 69
End: 2019-11-15
Payer: COMMERCIAL

## 2019-11-15 DIAGNOSIS — Z79.899 ENCOUNTER FOR LONG-TERM (CURRENT) USE OF OTHER MEDICATIONS: ICD-10-CM

## 2019-11-15 DIAGNOSIS — Z79.899 ENCOUNTER FOR LONG-TERM (CURRENT) USE OF OTHER MEDICATIONS: Primary | ICD-10-CM

## 2019-11-15 LAB
ALBUMIN SERPL BCP-MCNC: 3.8 G/DL (ref 3.5–5)
ALP SERPL-CCNC: 76 U/L (ref 46–116)
ALT SERPL W P-5'-P-CCNC: 24 U/L (ref 12–78)
ANION GAP SERPL CALCULATED.3IONS-SCNC: 5 MMOL/L (ref 4–13)
AST SERPL W P-5'-P-CCNC: 15 U/L (ref 5–45)
BACTERIA UR QL AUTO: ABNORMAL /HPF
BILIRUB SERPL-MCNC: 0.95 MG/DL (ref 0.2–1)
BILIRUB UR QL STRIP: NEGATIVE
BUN SERPL-MCNC: 11 MG/DL (ref 5–25)
CALCIUM SERPL-MCNC: 9.4 MG/DL (ref 8.3–10.1)
CHLORIDE SERPL-SCNC: 106 MMOL/L (ref 100–108)
CHOLEST SERPL-MCNC: 117 MG/DL (ref 50–200)
CK SERPL-CCNC: 154 U/L (ref 39–308)
CLARITY UR: CLEAR
CO2 SERPL-SCNC: 30 MMOL/L (ref 21–32)
COLOR UR: YELLOW
CREAT SERPL-MCNC: 0.85 MG/DL (ref 0.6–1.3)
CREAT UR-MCNC: 55 MG/DL
ERYTHROCYTE [DISTWIDTH] IN BLOOD BY AUTOMATED COUNT: 14.2 % (ref 11.6–15.1)
GFR SERPL CREATININE-BSD FRML MDRD: 88 ML/MIN/1.73SQ M
GLUCOSE P FAST SERPL-MCNC: 90 MG/DL (ref 65–99)
GLUCOSE UR STRIP-MCNC: NEGATIVE MG/DL
HCT VFR BLD AUTO: 43.3 % (ref 36.5–49.3)
HDLC SERPL-MCNC: 53 MG/DL
HGB BLD-MCNC: 13.8 G/DL (ref 12–17)
HGB UR QL STRIP.AUTO: NEGATIVE
HYALINE CASTS #/AREA URNS LPF: ABNORMAL /LPF
KETONES UR STRIP-MCNC: NEGATIVE MG/DL
LDLC SERPL CALC-MCNC: 54 MG/DL (ref 0–100)
LDLC SERPL DIRECT ASSAY-MCNC: 61 MG/DL (ref 0–100)
LEUKOCYTE ESTERASE UR QL STRIP: ABNORMAL
MCH RBC QN AUTO: 29.7 PG (ref 26.8–34.3)
MCHC RBC AUTO-ENTMCNC: 31.9 G/DL (ref 31.4–37.4)
MCV RBC AUTO: 93 FL (ref 82–98)
MICROALBUMIN UR-MCNC: 7.2 MG/L (ref 0–20)
MICROALBUMIN/CREAT 24H UR: 13 MG/G CREATININE (ref 0–30)
NITRITE UR QL STRIP: NEGATIVE
NON-SQ EPI CELLS URNS QL MICRO: ABNORMAL /HPF
NONHDLC SERPL-MCNC: 64 MG/DL
PH UR STRIP.AUTO: 6 [PH]
PLATELET # BLD AUTO: 171 THOUSANDS/UL (ref 149–390)
PMV BLD AUTO: 10.1 FL (ref 8.9–12.7)
POTASSIUM SERPL-SCNC: 4.5 MMOL/L (ref 3.5–5.3)
PROT SERPL-MCNC: 6.9 G/DL (ref 6.4–8.2)
PROT UR STRIP-MCNC: NEGATIVE MG/DL
RBC # BLD AUTO: 4.64 MILLION/UL (ref 3.88–5.62)
RBC #/AREA URNS AUTO: ABNORMAL /HPF
SODIUM SERPL-SCNC: 141 MMOL/L (ref 136–145)
SP GR UR STRIP.AUTO: 1 (ref 1–1.03)
TRIGL SERPL-MCNC: 52 MG/DL
UROBILINOGEN UR QL STRIP.AUTO: 0.2 E.U./DL
WBC # BLD AUTO: 5.09 THOUSAND/UL (ref 4.31–10.16)
WBC #/AREA URNS AUTO: ABNORMAL /HPF

## 2019-11-15 PROCEDURE — 82043 UR ALBUMIN QUANTITATIVE: CPT | Performed by: INTERNAL MEDICINE

## 2019-11-15 PROCEDURE — 80061 LIPID PANEL: CPT

## 2019-11-15 PROCEDURE — 82550 ASSAY OF CK (CPK): CPT

## 2019-11-15 PROCEDURE — 80053 COMPREHEN METABOLIC PANEL: CPT

## 2019-11-15 PROCEDURE — 36415 COLL VENOUS BLD VENIPUNCTURE: CPT

## 2019-11-15 PROCEDURE — 83721 ASSAY OF BLOOD LIPOPROTEIN: CPT

## 2019-11-15 PROCEDURE — 81001 URINALYSIS AUTO W/SCOPE: CPT | Performed by: INTERNAL MEDICINE

## 2019-11-15 PROCEDURE — 85027 COMPLETE CBC AUTOMATED: CPT

## 2019-11-15 PROCEDURE — 82570 ASSAY OF URINE CREATININE: CPT | Performed by: INTERNAL MEDICINE

## 2019-11-17 DIAGNOSIS — N13.8 BPH WITH OBSTRUCTION/LOWER URINARY TRACT SYMPTOMS: Primary | ICD-10-CM

## 2019-11-17 DIAGNOSIS — N40.1 BPH WITH OBSTRUCTION/LOWER URINARY TRACT SYMPTOMS: Primary | ICD-10-CM

## 2019-11-18 RX ORDER — ALFUZOSIN HYDROCHLORIDE 10 MG/1
TABLET, EXTENDED RELEASE ORAL
Qty: 90 TABLET | Refills: 3 | Status: SHIPPED | OUTPATIENT
Start: 2019-11-18 | End: 2020-10-21

## 2019-12-09 ENCOUNTER — OFFICE VISIT (OUTPATIENT)
Dept: PODIATRY | Facility: CLINIC | Age: 69
End: 2019-12-09
Payer: COMMERCIAL

## 2019-12-09 VITALS
SYSTOLIC BLOOD PRESSURE: 132 MMHG | HEIGHT: 70 IN | BODY MASS INDEX: 28 KG/M2 | WEIGHT: 195.6 LBS | HEART RATE: 69 BPM | DIASTOLIC BLOOD PRESSURE: 78 MMHG

## 2019-12-09 DIAGNOSIS — M72.2 PLANTAR FASCIITIS: Primary | ICD-10-CM

## 2019-12-09 DIAGNOSIS — M79.671 RIGHT FOOT PAIN: ICD-10-CM

## 2019-12-09 PROCEDURE — 99212 OFFICE O/P EST SF 10 MIN: CPT | Performed by: PODIATRIST

## 2019-12-09 NOTE — PROGRESS NOTES
Patient presents for assessment of his right heel  He has had 2 cortisone injections for plantar fasciitis pain  He relates no pain at this time  There is no pain with palpation of the medial heel  No additional treatment is needed  Linda aragon

## 2020-01-22 ENCOUNTER — LAB (OUTPATIENT)
Dept: LAB | Age: 70
End: 2020-01-22
Payer: COMMERCIAL

## 2020-01-22 DIAGNOSIS — N40.1 BPH WITH OBSTRUCTION/LOWER URINARY TRACT SYMPTOMS: ICD-10-CM

## 2020-01-22 DIAGNOSIS — N13.8 BPH WITH OBSTRUCTION/LOWER URINARY TRACT SYMPTOMS: ICD-10-CM

## 2020-01-22 LAB — PSA SERPL-MCNC: 1.5 NG/ML (ref 0–4)

## 2020-01-22 PROCEDURE — 84153 ASSAY OF PSA TOTAL: CPT

## 2020-02-03 ENCOUNTER — OFFICE VISIT (OUTPATIENT)
Dept: UROLOGY | Facility: CLINIC | Age: 70
End: 2020-02-03
Payer: COMMERCIAL

## 2020-02-03 VITALS
HEIGHT: 70 IN | DIASTOLIC BLOOD PRESSURE: 66 MMHG | HEART RATE: 58 BPM | SYSTOLIC BLOOD PRESSURE: 122 MMHG | WEIGHT: 194 LBS | BODY MASS INDEX: 27.77 KG/M2

## 2020-02-03 DIAGNOSIS — N13.8 BPH WITH OBSTRUCTION/LOWER URINARY TRACT SYMPTOMS: Primary | ICD-10-CM

## 2020-02-03 DIAGNOSIS — N40.1 BPH WITH OBSTRUCTION/LOWER URINARY TRACT SYMPTOMS: Primary | ICD-10-CM

## 2020-02-03 PROCEDURE — 99213 OFFICE O/P EST LOW 20 MIN: CPT | Performed by: PHYSICIAN ASSISTANT

## 2020-02-03 NOTE — PROGRESS NOTES
UROLOGY PROGRESS NOTE   Patient Identifiers: Philip Guillory (MRN [id number])  Date of Service: 2/3/2020    Subjective:    73-year-old man history of BPH  He was on alfuzosin which improved his symptoms  Now he is wearing CPAP which is actually greatly improved his symptoms  AUA index score is 11  His PSA is 1 5  Reason for visit:  BPH follow-up    Objective:     VITALS:    Vitals:    02/03/20 1006   BP: 122/66   Pulse: 58     AUA SYMPTOM SCORE      Most Recent Value   AUA SYMPTOM SCORE   How often have you had a sensation of not emptying your bladder completely after you finished urinating? 2   How often have you had to urinate again less than two hours after you finished urinating? 2   How often have you found you stopped and started again several times when you urinate? 2   How often have you found it difficult to postpone urination? 1   How often have you had a weak urinary stream?  1   How often have you had to push or strain to begin urination? 1   How many times did you most typically get up to urinate from the time you went to bed at night until the time you got up in the morning?   2   Quality of Life: If you were to spend the rest of your life with your urinary condition just the way it is now, how would you feel about that?  1   AUA SYMPTOM SCORE  11            LABS:  Lab Results   Component Value Date    HGB 13 8 11/15/2019    HCT 43 3 11/15/2019    WBC 5 09 11/15/2019     11/15/2019   ]    Lab Results   Component Value Date    K 4 5 11/15/2019     11/15/2019    CO2 30 11/15/2019    BUN 11 11/15/2019    CREATININE 0 85 11/15/2019    CALCIUM 9 4 11/15/2019   ]        INPATIENT MEDS:    Current Outpatient Medications:     alfuzosin (UROXATRAL) 10 mg 24 hr tablet, TAKE 1 TABLET BY MOUTH  DAILY, Disp: 90 tablet, Rfl: 3    aspirin (ECOTRIN LOW STRENGTH) 81 mg EC tablet, Take 81 mg by mouth daily at bedtime , Disp: , Rfl:     Cholecalciferol 2000 units CAPS, Take 1 capsule by mouth daily in the early morning , Disp: , Rfl:     Coenzyme Q10 (CO Q10 PO), Take 1 capsule by mouth daily in the early morning, Disp: , Rfl:     DIGOX 125 MCG tablet, Take 1 tablet by mouth daily in the early morning , Disp: , Rfl:     fluticasone-salmeterol (ADVAIR DISKUS) 500-50 mcg/dose inhaler, as needed, Disp: , Rfl:     FOLIC ACID PO, Take 662 mcg by mouth daily in the early morning , Disp: , Rfl:     multivitamin (THERAGRAN) TABS, Take 1 tablet by mouth daily in the early morning , Disp: , Rfl:     omeprazole (PriLOSEC) 40 MG capsule, Take 40 mg by mouth daily in the early morning 1 po qd, Disp: , Rfl:     pindolol (VISKEN) 5 mg tablet, Take 5 mg by mouth daily in the early morning 1 po qam, Disp: , Rfl:     ramipril (ALTACE) 5 mg capsule, Take 5 mg by mouth daily at bedtime 1 po qhs, Disp: , Rfl:     rosuvastatin (CRESTOR) 5 mg tablet, TAKE 1 TABLET BY MOUTH EVERYDAY AT BEDTIME, Disp: , Rfl:     Saw [place] 450 MG CAPS, Take 1 capsule by mouth daily at bedtime 1 po qd , Disp: , Rfl:     acetaminophen (TYLENOL) 500 mg tablet, Take 500 mg by mouth every 6 (six) hours as needed , Disp: , Rfl:     OXYGEN-HELIUM IN, Inhale, Disp: , Rfl:       Physical Exam:   /66 (BP Location: Left arm, Patient Position: Sitting, Cuff Size: Adult)   Pulse 58   Ht 5' 10" (1 778 m)   Wt 88 kg (194 lb)   BMI 27 84 kg/m²   GEN: no acute distress    RESP: breathing comfortably with no accessory muscle use    ABD: soft, non-tender, non-distended   INCISION:    EXT: no significant peripheral edema   (Male): Penis circumcised, phallus normal, meatus patent  Testicles descended into scrotum bilaterally without masses nor tenderness  No inguinal hernias bilaterally  STAN: Prostate is enlarged at 35 grams  The prostate is not boggy  The prostate is not tender  No nodules noted      RADIOLOGY:    none     Assessment:    1   BPH     Plan:   - follow-up in 1 year with PSA prior to visit  -  -  -

## 2020-05-22 ENCOUNTER — TRANSCRIBE ORDERS (OUTPATIENT)
Dept: ADMINISTRATIVE | Age: 70
End: 2020-05-22

## 2020-05-22 ENCOUNTER — LAB (OUTPATIENT)
Dept: LAB | Age: 70
End: 2020-05-22
Payer: COMMERCIAL

## 2020-05-22 DIAGNOSIS — I25.119 CORONARY ARTERY DISEASE INVOLVING NATIVE CORONARY ARTERY WITH ANGINA PECTORIS, UNSPECIFIED WHETHER NATIVE OR TRANSPLANTED HEART (HCC): Primary | ICD-10-CM

## 2020-05-22 DIAGNOSIS — Z00.00 HEALTH CARE MAINTENANCE: ICD-10-CM

## 2020-05-22 DIAGNOSIS — Z79.899 ON STATIN THERAPY: ICD-10-CM

## 2020-05-22 DIAGNOSIS — Z51.81 ENCOUNTER FOR MONITORING DIGOXIN THERAPY: ICD-10-CM

## 2020-05-22 DIAGNOSIS — Z79.899 ENCOUNTER FOR MONITORING DIGOXIN THERAPY: ICD-10-CM

## 2020-05-22 DIAGNOSIS — E55.9 VITAMIN D DEFICIENCY: ICD-10-CM

## 2020-05-22 LAB
25(OH)D3 SERPL-MCNC: 46.1 NG/ML (ref 30–100)
ALBUMIN SERPL BCP-MCNC: 3.5 G/DL (ref 3.5–5)
ALP SERPL-CCNC: 82 U/L (ref 46–116)
ALT SERPL W P-5'-P-CCNC: 23 U/L (ref 12–78)
ANION GAP SERPL CALCULATED.3IONS-SCNC: 2 MMOL/L (ref 4–13)
AST SERPL W P-5'-P-CCNC: 13 U/L (ref 5–45)
BACTERIA UR QL AUTO: NORMAL /HPF
BILIRUB SERPL-MCNC: 0.8 MG/DL (ref 0.2–1)
BILIRUB UR QL STRIP: NEGATIVE
BUN SERPL-MCNC: 10 MG/DL (ref 5–25)
CALCIUM SERPL-MCNC: 9 MG/DL (ref 8.3–10.1)
CHLORIDE SERPL-SCNC: 106 MMOL/L (ref 100–108)
CK MB SERPL-MCNC: 1.6 % (ref 0–2.5)
CK MB SERPL-MCNC: 2.7 NG/ML (ref 0–5)
CK SERPL-CCNC: 164 U/L (ref 39–308)
CLARITY UR: CLEAR
CO2 SERPL-SCNC: 30 MMOL/L (ref 21–32)
COLOR UR: YELLOW
CREAT SERPL-MCNC: 0.87 MG/DL (ref 0.6–1.3)
CREAT UR-MCNC: 49.7 MG/DL
DIGOXIN SERPL-MCNC: 0.6 NG/ML (ref 0.8–2)
GFR SERPL CREATININE-BSD FRML MDRD: 87 ML/MIN/1.73SQ M
GLUCOSE P FAST SERPL-MCNC: 86 MG/DL (ref 65–99)
GLUCOSE UR STRIP-MCNC: NEGATIVE MG/DL
HGB UR QL STRIP.AUTO: NEGATIVE
HYALINE CASTS #/AREA URNS LPF: NORMAL /LPF
KETONES UR STRIP-MCNC: NEGATIVE MG/DL
LDLC SERPL DIRECT ASSAY-MCNC: 57 MG/DL (ref 0–100)
LEUKOCYTE ESTERASE UR QL STRIP: NEGATIVE
MICROALBUMIN UR-MCNC: <5 MG/L (ref 0–20)
MICROALBUMIN/CREAT 24H UR: <10 MG/G CREATININE (ref 0–30)
NITRITE UR QL STRIP: NEGATIVE
NON-SQ EPI CELLS URNS QL MICRO: NORMAL /HPF
PH UR STRIP.AUTO: 7 [PH]
POTASSIUM SERPL-SCNC: 3.9 MMOL/L (ref 3.5–5.3)
PROT SERPL-MCNC: 6.6 G/DL (ref 6.4–8.2)
PROT UR STRIP-MCNC: NEGATIVE MG/DL
RBC #/AREA URNS AUTO: NORMAL /HPF
SODIUM SERPL-SCNC: 138 MMOL/L (ref 136–145)
SP GR UR STRIP.AUTO: 1.01 (ref 1–1.03)
UROBILINOGEN UR QL STRIP.AUTO: 0.2 E.U./DL
WBC #/AREA URNS AUTO: NORMAL /HPF

## 2020-05-22 PROCEDURE — 82550 ASSAY OF CK (CPK): CPT

## 2020-05-22 PROCEDURE — 80162 ASSAY OF DIGOXIN TOTAL: CPT

## 2020-05-22 PROCEDURE — 82043 UR ALBUMIN QUANTITATIVE: CPT | Performed by: INTERNAL MEDICINE

## 2020-05-22 PROCEDURE — 82553 CREATINE MB FRACTION: CPT

## 2020-05-22 PROCEDURE — 82306 VITAMIN D 25 HYDROXY: CPT

## 2020-05-22 PROCEDURE — 82570 ASSAY OF URINE CREATININE: CPT | Performed by: INTERNAL MEDICINE

## 2020-05-22 PROCEDURE — 83721 ASSAY OF BLOOD LIPOPROTEIN: CPT

## 2020-05-22 PROCEDURE — 80053 COMPREHEN METABOLIC PANEL: CPT

## 2020-05-22 PROCEDURE — 36415 COLL VENOUS BLD VENIPUNCTURE: CPT

## 2020-05-22 PROCEDURE — 81001 URINALYSIS AUTO W/SCOPE: CPT | Performed by: INTERNAL MEDICINE

## 2020-08-27 ENCOUNTER — OFFICE VISIT (OUTPATIENT)
Dept: PODIATRY | Facility: CLINIC | Age: 70
End: 2020-08-27
Payer: COMMERCIAL

## 2020-08-27 VITALS
HEIGHT: 70 IN | BODY MASS INDEX: 27.92 KG/M2 | SYSTOLIC BLOOD PRESSURE: 151 MMHG | HEART RATE: 52 BPM | WEIGHT: 195 LBS | DIASTOLIC BLOOD PRESSURE: 81 MMHG

## 2020-08-27 DIAGNOSIS — M72.2 PLANTAR FASCIITIS: Primary | ICD-10-CM

## 2020-08-27 DIAGNOSIS — M77.41 METATARSALGIA OF RIGHT FOOT: ICD-10-CM

## 2020-08-27 DIAGNOSIS — M79.671 RIGHT FOOT PAIN: ICD-10-CM

## 2020-08-27 PROCEDURE — 20550 NJX 1 TENDON SHEATH/LIGAMENT: CPT | Performed by: PODIATRIST

## 2020-08-27 PROCEDURE — 99213 OFFICE O/P EST LOW 20 MIN: CPT | Performed by: PODIATRIST

## 2020-08-27 RX ORDER — BUPIVACAINE HYDROCHLORIDE 5 MG/ML
1 INJECTION, SOLUTION EPIDURAL; INTRACAUDAL ONCE
Status: COMPLETED | OUTPATIENT
Start: 2020-08-27 | End: 2020-08-27

## 2020-08-27 RX ORDER — LIDOCAINE HYDROCHLORIDE 10 MG/ML
1 INJECTION, SOLUTION EPIDURAL; INFILTRATION; INTRACAUDAL; PERINEURAL ONCE
Status: COMPLETED | OUTPATIENT
Start: 2020-08-27 | End: 2020-08-27

## 2020-08-27 RX ORDER — TRIAMCINOLONE ACETONIDE 40 MG/ML
20 INJECTION, SUSPENSION INTRA-ARTICULAR; INTRAMUSCULAR ONCE
Status: COMPLETED | OUTPATIENT
Start: 2020-08-27 | End: 2020-08-27

## 2020-08-27 RX ADMIN — LIDOCAINE HYDROCHLORIDE 1 ML: 10 INJECTION, SOLUTION EPIDURAL; INFILTRATION; INTRACAUDAL; PERINEURAL at 17:46

## 2020-08-27 RX ADMIN — BUPIVACAINE HYDROCHLORIDE 1 ML: 5 INJECTION, SOLUTION EPIDURAL; INTRACAUDAL at 17:45

## 2020-08-27 RX ADMIN — TRIAMCINOLONE ACETONIDE 20 MG: 40 INJECTION, SUSPENSION INTRA-ARTICULAR; INTRAMUSCULAR at 17:46

## 2020-08-27 NOTE — PROGRESS NOTES
Assessment/Plan:    Explained the patient that he is dealing with a flare up of plantar fasciitis affecting the right foot  Reinjected right heel with 0 5 cc Kenalog 40 along with 1 cc 1% xylocaine and 1 cc 0 5% Marcaine  This is his 1st injection in many months  Recommended Voltaren gel for the forefoot pain  Dispensed pure stride insoles size 10 5  Patient is leaving the area until October  He will contact me if pain is back at that time  No problem-specific Assessment & Plan notes found for this encounter  Diagnoses and all orders for this visit:    Plantar fasciitis  -     bupivacaine (PF) (MARCAINE) 0 5 % injection 1 mL  -     lidocaine (PF) (XYLOCAINE-MPF) 1 % injection 1 mL  -     triamcinolone acetonide (KENALOG-40) 40 mg/mL injection 20 mg    Right foot pain    Metatarsalgia of right foot          Subjective:      Patient ID: Kendra Burciaga is a 68 y o  male  HPI     Patient presents with recurrence of pain in his right heel  Patient was treated for plantar fasciitis via cortisone injection successfully over 6 months ago  Heel pain recurred approximately 2 months ago  Symptoms of post static dyskinesia related  Patient notes that he also has pain along the side of the right foot with extended walking  He does not have pain here if he does not walk distance  Patient also states that he used to have custom orthotics but lost the 1 for the right foot  The following portions of the patient's history were reviewed and updated as appropriate: allergies, current medications, past family history, past medical history, past social history, past surgical history and problem list     Review of Systems   HENT: Positive for hearing loss  Respiratory: Negative  Cardiovascular:        History of cardiovascular disease   Gastrointestinal: Negative  Musculoskeletal: Negative  Neurological: Negative                Objective:      /81   Pulse (!) 52   Ht 5' 10" (1 778 m)   Wt 88 5 kg (195 lb)   BMI 27 98 kg/m²          Physical Exam  Cardiovascular:      Pulses: Normal pulses  Musculoskeletal:         General: Tenderness present  Comments: Pain with palpation medial aspect right heel at fascia insertion into the calcaneus  Pain elicited with strands were squeezing of the right forefoot  Skin:     General: Skin is warm  Neurological:      General: No focal deficit present  Mental Status: He is oriented to person, place, and time  Foot injection     Date/Time 8/27/2020 5:54 PM     Performed by  Renetta Cameron DPM     Authorized by Renetat Cameron DPM      Universal Protocol Consent: Verbal consent obtained  Risks and benefits: risks, benefits and alternatives were discussed  Consent given by: patient  Patient identity confirmed: verbally with patient        Site preparation: Isopropyl alcohol    Local anesthesia used: yes     Anesthesia   Local anesthesia used: yes  Local Anesthetic: lidocaine 1% without epinephrine and bupivacaine 0 5% without epinephrine     Procedure Details   Procedure Notes: Injected right heel with 0 5 cc Kenalog 40 along with 1 cc 1% xylocaine and 1 cc 0 5% Marcaine

## 2020-10-18 DIAGNOSIS — N13.8 BPH WITH OBSTRUCTION/LOWER URINARY TRACT SYMPTOMS: ICD-10-CM

## 2020-10-18 DIAGNOSIS — N40.1 BPH WITH OBSTRUCTION/LOWER URINARY TRACT SYMPTOMS: ICD-10-CM

## 2020-10-21 RX ORDER — ALFUZOSIN HYDROCHLORIDE 10 MG/1
TABLET, EXTENDED RELEASE ORAL
Qty: 90 TABLET | Refills: 3 | Status: SHIPPED | OUTPATIENT
Start: 2020-10-21 | End: 2021-10-27

## 2020-11-02 ENCOUNTER — OFFICE VISIT (OUTPATIENT)
Dept: PODIATRY | Facility: CLINIC | Age: 70
End: 2020-11-02
Payer: COMMERCIAL

## 2020-11-02 VITALS
BODY MASS INDEX: 27.6 KG/M2 | TEMPERATURE: 97.2 F | HEART RATE: 68 BPM | WEIGHT: 192.8 LBS | HEIGHT: 70 IN | SYSTOLIC BLOOD PRESSURE: 129 MMHG | DIASTOLIC BLOOD PRESSURE: 82 MMHG

## 2020-11-02 DIAGNOSIS — M72.2 PLANTAR FASCIITIS: Primary | ICD-10-CM

## 2020-11-02 PROCEDURE — 99212 OFFICE O/P EST SF 10 MIN: CPT | Performed by: PODIATRIST

## 2020-11-13 ENCOUNTER — TRANSCRIBE ORDERS (OUTPATIENT)
Dept: ADMINISTRATIVE | Age: 70
End: 2020-11-13

## 2020-11-13 ENCOUNTER — LAB (OUTPATIENT)
Dept: LAB | Age: 70
End: 2020-11-13
Payer: COMMERCIAL

## 2020-11-13 DIAGNOSIS — I25.119 ATHEROSCLEROSIS OF NATIVE CORONARY ARTERY WITH ANGINA PECTORIS, UNSPECIFIED WHETHER NATIVE OR TRANSPLANTED HEART (HCC): ICD-10-CM

## 2020-11-13 DIAGNOSIS — E78.5 HYPERLIPIDEMIA, UNSPECIFIED HYPERLIPIDEMIA TYPE: Primary | ICD-10-CM

## 2020-11-13 DIAGNOSIS — E78.5 HYPERLIPIDEMIA, UNSPECIFIED HYPERLIPIDEMIA TYPE: ICD-10-CM

## 2020-11-13 LAB
ALBUMIN SERPL BCP-MCNC: 3.6 G/DL (ref 3.5–5)
ALP SERPL-CCNC: 76 U/L (ref 46–116)
ALT SERPL W P-5'-P-CCNC: 26 U/L (ref 12–78)
ANION GAP SERPL CALCULATED.3IONS-SCNC: 2 MMOL/L (ref 4–13)
AST SERPL W P-5'-P-CCNC: 19 U/L (ref 5–45)
BACTERIA UR QL AUTO: NORMAL /HPF
BASOPHILS # BLD AUTO: 0.02 THOUSANDS/ΜL (ref 0–0.1)
BASOPHILS NFR BLD AUTO: 0 % (ref 0–1)
BILIRUB SERPL-MCNC: 0.63 MG/DL (ref 0.2–1)
BILIRUB UR QL STRIP: NEGATIVE
BUN SERPL-MCNC: 13 MG/DL (ref 5–25)
CALCIUM SERPL-MCNC: 9.2 MG/DL (ref 8.3–10.1)
CHLORIDE SERPL-SCNC: 105 MMOL/L (ref 100–108)
CHOLEST SERPL-MCNC: 121 MG/DL (ref 50–200)
CLARITY UR: CLEAR
CO2 SERPL-SCNC: 32 MMOL/L (ref 21–32)
COLOR UR: YELLOW
CREAT SERPL-MCNC: 0.89 MG/DL (ref 0.6–1.3)
CREAT UR-MCNC: 45.9 MG/DL
DIGOXIN SERPL-MCNC: 0.5 NG/ML (ref 0.8–2)
EOSINOPHIL # BLD AUTO: 0.12 THOUSAND/ΜL (ref 0–0.61)
EOSINOPHIL NFR BLD AUTO: 2 % (ref 0–6)
ERYTHROCYTE [DISTWIDTH] IN BLOOD BY AUTOMATED COUNT: 13.9 % (ref 11.6–15.1)
GFR SERPL CREATININE-BSD FRML MDRD: 85 ML/MIN/1.73SQ M
GLUCOSE P FAST SERPL-MCNC: 88 MG/DL (ref 65–99)
GLUCOSE UR STRIP-MCNC: NEGATIVE MG/DL
HCT VFR BLD AUTO: 42.3 % (ref 36.5–49.3)
HDLC SERPL-MCNC: 48 MG/DL
HGB BLD-MCNC: 14 G/DL (ref 12–17)
HGB UR QL STRIP.AUTO: NEGATIVE
HYALINE CASTS #/AREA URNS LPF: NORMAL /LPF
IMM GRANULOCYTES # BLD AUTO: 0.02 THOUSAND/UL (ref 0–0.2)
IMM GRANULOCYTES NFR BLD AUTO: 0 % (ref 0–2)
KETONES UR STRIP-MCNC: NEGATIVE MG/DL
LDLC SERPL CALC-MCNC: 62 MG/DL (ref 0–100)
LDLC SERPL DIRECT ASSAY-MCNC: 66 MG/DL (ref 0–100)
LEUKOCYTE ESTERASE UR QL STRIP: NEGATIVE
LYMPHOCYTES # BLD AUTO: 1.22 THOUSANDS/ΜL (ref 0.6–4.47)
LYMPHOCYTES NFR BLD AUTO: 25 % (ref 14–44)
MCH RBC QN AUTO: 30.6 PG (ref 26.8–34.3)
MCHC RBC AUTO-ENTMCNC: 33.1 G/DL (ref 31.4–37.4)
MCV RBC AUTO: 92 FL (ref 82–98)
MICROALBUMIN UR-MCNC: <5 MG/L (ref 0–20)
MICROALBUMIN/CREAT 24H UR: <11 MG/G CREATININE (ref 0–30)
MONOCYTES # BLD AUTO: 0.54 THOUSAND/ΜL (ref 0.17–1.22)
MONOCYTES NFR BLD AUTO: 11 % (ref 4–12)
NEUTROPHILS # BLD AUTO: 3.03 THOUSANDS/ΜL (ref 1.85–7.62)
NEUTS SEG NFR BLD AUTO: 62 % (ref 43–75)
NITRITE UR QL STRIP: NEGATIVE
NON-SQ EPI CELLS URNS QL MICRO: NORMAL /HPF
NONHDLC SERPL-MCNC: 73 MG/DL
NRBC BLD AUTO-RTO: 0 /100 WBCS
PH UR STRIP.AUTO: 7.5 [PH]
PLATELET # BLD AUTO: 179 THOUSANDS/UL (ref 149–390)
PMV BLD AUTO: 9.9 FL (ref 8.9–12.7)
POTASSIUM SERPL-SCNC: 4 MMOL/L (ref 3.5–5.3)
PROT SERPL-MCNC: 6.6 G/DL (ref 6.4–8.2)
PROT UR STRIP-MCNC: NEGATIVE MG/DL
RBC # BLD AUTO: 4.58 MILLION/UL (ref 3.88–5.62)
RBC #/AREA URNS AUTO: NORMAL /HPF
SODIUM SERPL-SCNC: 139 MMOL/L (ref 136–145)
SP GR UR STRIP.AUTO: 1.01 (ref 1–1.03)
TRIGL SERPL-MCNC: 55 MG/DL
TSH SERPL DL<=0.05 MIU/L-ACNC: 2.88 UIU/ML (ref 0.36–3.74)
UROBILINOGEN UR QL STRIP.AUTO: 0.2 E.U./DL
WBC # BLD AUTO: 4.95 THOUSAND/UL (ref 4.31–10.16)
WBC #/AREA URNS AUTO: NORMAL /HPF

## 2020-11-13 PROCEDURE — 84443 ASSAY THYROID STIM HORMONE: CPT

## 2020-11-13 PROCEDURE — 80162 ASSAY OF DIGOXIN TOTAL: CPT

## 2020-11-13 PROCEDURE — 82043 UR ALBUMIN QUANTITATIVE: CPT | Performed by: INTERNAL MEDICINE

## 2020-11-13 PROCEDURE — 82570 ASSAY OF URINE CREATININE: CPT | Performed by: INTERNAL MEDICINE

## 2020-11-13 PROCEDURE — 85025 COMPLETE CBC W/AUTO DIFF WBC: CPT

## 2020-11-13 PROCEDURE — 36415 COLL VENOUS BLD VENIPUNCTURE: CPT

## 2020-11-13 PROCEDURE — 81001 URINALYSIS AUTO W/SCOPE: CPT | Performed by: INTERNAL MEDICINE

## 2020-11-13 PROCEDURE — 80061 LIPID PANEL: CPT

## 2020-11-13 PROCEDURE — 80053 COMPREHEN METABOLIC PANEL: CPT

## 2020-11-13 PROCEDURE — 83721 ASSAY OF BLOOD LIPOPROTEIN: CPT

## 2021-01-25 ENCOUNTER — LAB (OUTPATIENT)
Dept: LAB | Age: 71
End: 2021-01-25
Payer: COMMERCIAL

## 2021-01-25 DIAGNOSIS — N13.8 BPH WITH OBSTRUCTION/LOWER URINARY TRACT SYMPTOMS: ICD-10-CM

## 2021-01-25 DIAGNOSIS — N40.1 BPH WITH OBSTRUCTION/LOWER URINARY TRACT SYMPTOMS: ICD-10-CM

## 2021-01-25 LAB — PSA SERPL-MCNC: 1.6 NG/ML (ref 0–4)

## 2021-01-25 PROCEDURE — 84153 ASSAY OF PSA TOTAL: CPT

## 2021-02-08 ENCOUNTER — OFFICE VISIT (OUTPATIENT)
Dept: UROLOGY | Facility: AMBULATORY SURGERY CENTER | Age: 71
End: 2021-02-08
Payer: COMMERCIAL

## 2021-02-08 VITALS
BODY MASS INDEX: 27.2 KG/M2 | DIASTOLIC BLOOD PRESSURE: 60 MMHG | WEIGHT: 190 LBS | HEIGHT: 70 IN | SYSTOLIC BLOOD PRESSURE: 110 MMHG | HEART RATE: 70 BPM

## 2021-02-08 DIAGNOSIS — N13.8 BPH WITH OBSTRUCTION/LOWER URINARY TRACT SYMPTOMS: Primary | ICD-10-CM

## 2021-02-08 DIAGNOSIS — N40.1 BPH WITH OBSTRUCTION/LOWER URINARY TRACT SYMPTOMS: Primary | ICD-10-CM

## 2021-02-08 DIAGNOSIS — Z12.5 PROSTATE CANCER SCREENING: ICD-10-CM

## 2021-02-08 LAB — POST-VOID RESIDUAL VOLUME, ML POC: 12 ML

## 2021-02-08 PROCEDURE — 51798 US URINE CAPACITY MEASURE: CPT | Performed by: NURSE PRACTITIONER

## 2021-02-08 PROCEDURE — 99213 OFFICE O/P EST LOW 20 MIN: CPT | Performed by: NURSE PRACTITIONER

## 2021-02-08 NOTE — PROGRESS NOTES
2/8/2021      Chief Complaint   Patient presents with    Elevated PSA    Benign Prostatic Hypertrophy     Assessment and Plan    65 y o  male managed by Dr Nancy Tracey    1  Benign prostatic hyperplasia  ·  Continue alfuzosin- prescription refill not needed at this time  ·  PSA performed 01/25/2021 resulted 1 6  ·  STAN:  Prostate size of 45 g with no nodules  ·  repeat PSA and STAN in 1 year  ·  follow up in the office in 1 year    History of Present Illness  Norlin Duverney is a 65 y o  male here for follow up evaluation of urinary symptoms secondary to benign prostatic hyperplasia  Patient has been managed on alfuzosin daily with good control of urinary symptoms  He denies exacerbations of his urinary symptoms at this time  He is very happy with his current urinary status  He denies changes to his general health since his previous office visit  Most recent PSA performed 01/25/2021 resulted 1 6  PSA trend below  Component       PSA, Total   Latest Ref Rng & Units       0 0 - 4 0 ng/mL   1/22/2020      10:28 AM 1 5   1/25/2021      7:12 AM 1 6     Review of Systems   Constitutional: Negative for chills and fever  Respiratory: Negative for cough and shortness of breath  Cardiovascular: Negative for chest pain  Gastrointestinal: Negative for abdominal distention, abdominal pain, blood in stool, nausea and vomiting  Genitourinary: Negative for difficulty urinating, dysuria, enuresis, flank pain, frequency, hematuria and urgency  Skin: Negative for rash  AUA SYMPTOM SCORE      Most Recent Value   AUA SYMPTOM SCORE   How often have you had a sensation of not emptying your bladder completely after you finished urinating? 1   How often have you had to urinate again less than two hours after you finished urinating? 2   How often have you found you stopped and started again several times when you urinate? 2   How often have you found it difficult to postpone urination?   2   How often have you had a weak urinary stream?  1   How often have you had to push or strain to begin urination? 1   How many times did you most typically get up to urinate from the time you went to bed at night until the time you got up in the morning? 1   Quality of Life: If you were to spend the rest of your life with your urinary condition just the way it is now, how would you feel about that?  1   AUA SYMPTOM SCORE  10             Past Medical History  Past Medical History:   Diagnosis Date    Cancer ([facility] )     skin - squamos cell    GERD (gastroesophageal reflux disease)     Hearing aid worn     Bilateral    Heart disease     Hyperlipidemia     Hypertension     Nocturnal hypoxia     Use 2LNC Concentrated O2 at Night only    PAT (paroxysmal atrial tachycardia) ([facility] )     Pneumonia 2016    PVC (premature ventricular contraction)     Tenosynovitis        Past Social History  Past Surgical History:   Procedure Laterality Date    CARDIAC SURGERY  1996    CATARACT EXTRACTION      COLONOSCOPY      CORONARY ARTERY BYPASS GRAFT      CYST REMOVAL      Chest    FOOT SURGERY Left 2001    KNEE SURGERY Left     as a child    VA COLONOSCOPY FLX DX W/COLLJ SPEC WHEN PFRMD N/A 3/27/2019    Procedure: COLONOSCOPY;  Surgeon: Ernestine Cohen MD;  [facility];   Service: Gastroenterology    VA REPAIR [address] HERNIA,5+Y/O,REDUCIBL Left 2/22/2019    Procedure: REPAIR HERNIA INGUINAL;  Surgeon: Jay Parson MD;  [facility];  Service: General    TONSILLECTOMY      TOTAL HIP ARTHROPLASTY Right 11/2017     Social History     Tobacco Use   Smoking Status Never Smoker   Smokeless Tobacco Never Used       Past Family History  Family History   Problem Relation Age of Onset    Heart disease Mother     Hypertension Mother     Other Son         Leiomyosarcoma       Past Social history  Social History     Socioeconomic History    Marital status: /Civil Union     Spouse name: Not on file    Number of children: Not on file    Years of education: Not on file    Highest education level: Not on file   Occupational History    Occupation: Retired   Social Needs    Financial resource strain: Not on file    Food insecurity     Worry: Not on file     Inability: Not on file   [employer] needs     Medical: Not on file     Non-medical: Not on file   Tobacco Use    Smoking status: Never Smoker    Smokeless tobacco: Never Used   Substance and Sexual Activity    Alcohol use: No    Drug use: No    Sexual activity: Not on file   Lifestyle    Physical activity     Days per week: Not on file     Minutes per session: Not on file    Stress: Not on file   Relationships    Social connections     Talks on phone: Not on file     Gets together: Not on file     Attends [employer] service: Not on file     Active member of club or organization: Not on file     Attends meetings of clubs or organizations: Not on file     Relationship status: Not on file    Intimate partner violence     Fear of current or ex partner: Not on file     Emotionally abused: Not on file     Physically abused: Not on file     Forced sexual activity: Not on file   Other Topics Concern    Not on file   Social History Narrative    Not on file       Current Medications  Current Outpatient Medications   Medication Sig Dispense Refill    acetaminophen (TYLENOL) 500 mg tablet Take 500 mg by mouth every 6 (six) hours as needed       alfuzosin (UROXATRAL) 10 mg 24 hr tablet TAKE 1 TABLET BY MOUTH  DAILY 90 tablet 3    aspirin (ECOTRIN LOW STRENGTH) 81 mg EC tablet Take 81 mg by mouth daily at bedtime       Cholecalciferol 2000 units CAPS Take 1 capsule by mouth daily in the early morning       Coenzyme Q10 (CO Q10 PO) Take 1 capsule by mouth daily in the early morning      DIGOX 125 MCG tablet Take 1 tablet by mouth daily in the early morning       FOLIC ACID PO Take 606 mcg by mouth daily in the early morning       multivitamin (THERAGRAN) TABS Take 1 tablet by mouth daily in the early morning       omeprazole (PriLOSEC) 40 MG capsule Take 40 mg by mouth daily in the early morning 1 po qd      pindolol (VISKEN) 5 mg tablet Take 5 mg by mouth daily in the early morning 1 po qam      ramipril (ALTACE) 5 mg capsule Take 5 mg by mouth daily at bedtime 1 po qhs      rosuvastatin (CRESTOR) 5 mg tablet TAKE 1 TABLET BY MOUTH EVERYDAY AT BEDTIME      Saw [place] 450 MG CAPS Take 1 capsule by mouth daily at bedtime 1 po qd       fluticasone-salmeterol (ADVAIR DISKUS) 500-50 mcg/dose inhaler as needed      OXYGEN-HELIUM IN Inhale       No current facility-administered medications for this visit  Allergies  Allergies   Allergen Reactions    Penicillins Rash    Shellfish-Derived Products Other (See Comments)     Unknown         The following portions of the patient's history were reviewed and updated as appropriate: allergies, current medications, past medical history, past social history, past surgical history and problem list       Vitals  Vitals:    02/08/21 1105   BP: 110/60   Pulse: 70   Weight: 86 2 kg (190 lb)   Height: 5' 10" (1 778 m)           Physical Exam  Physical Exam  Vitals signs reviewed  Constitutional:       General: He is not in acute distress  Appearance: Normal appearance  He is normal weight  HENT:      Head: Normocephalic  Eyes:      Pupils: Pupils are equal, round, and reactive to light  Cardiovascular:      Rate and Rhythm: Normal rate  Pulmonary:      Effort: No respiratory distress  Breath sounds: Normal breath sounds  Genitourinary:     Comments:   STAN-prostate size of 45 g with no nodules  Skin:     General: Skin is warm and dry  Neurological:      General: No focal deficit present  Mental Status: He is alert and oriented to person, place, and time     Psychiatric:         Mood and Affect: Mood normal          Behavior: Behavior normal        Results  No results found for this or any previous visit (from the past 1 hour(s)) ]  Lab Results   Component Value Date    PSA 1 6 01/25/2021    PSA 1 5 01/22/2020     Lab Results   Component Value Date    CALCIUM 9 2 11/13/2020    K 4 0 11/13/2020    CO2 32 11/13/2020     11/13/2020    BUN 13 11/13/2020    CREATININE 0 89 11/13/2020     Lab Results   Component Value Date    WBC 4 95 11/13/2020    HGB 14 0 11/13/2020    HCT 42 3 11/13/2020    MCV 92 11/13/2020     11/13/2020           Orders  Orders Placed This Encounter   Procedures    POCT Measure PVR       MOON Rojas

## 2021-03-02 DIAGNOSIS — Z23 ENCOUNTER FOR IMMUNIZATION: ICD-10-CM

## 2021-06-07 ENCOUNTER — TRANSCRIBE ORDERS (OUTPATIENT)
Dept: ADMINISTRATIVE | Age: 71
End: 2021-06-07

## 2021-06-07 ENCOUNTER — APPOINTMENT (OUTPATIENT)
Dept: LAB | Age: 71
End: 2021-06-07
Payer: COMMERCIAL

## 2021-06-07 DIAGNOSIS — I25.10 ATHEROSCLEROSIS OF NATIVE CORONARY ARTERY OF NATIVE HEART WITHOUT ANGINA PECTORIS: ICD-10-CM

## 2021-06-07 DIAGNOSIS — E78.2 MIXED HYPERLIPIDEMIA: ICD-10-CM

## 2021-06-07 DIAGNOSIS — E78.2 MIXED HYPERLIPIDEMIA: Primary | ICD-10-CM

## 2021-06-07 LAB
ALBUMIN SERPL BCP-MCNC: 3.6 G/DL (ref 3.5–5)
ALP SERPL-CCNC: 79 U/L (ref 46–116)
ALT SERPL W P-5'-P-CCNC: 23 U/L (ref 12–78)
ANION GAP SERPL CALCULATED.3IONS-SCNC: 3 MMOL/L (ref 4–13)
AST SERPL W P-5'-P-CCNC: 15 U/L (ref 5–45)
BACTERIA UR QL AUTO: NORMAL /HPF
BASOPHILS # BLD AUTO: 0.02 THOUSANDS/ΜL (ref 0–0.1)
BASOPHILS NFR BLD AUTO: 0 % (ref 0–1)
BILIRUB SERPL-MCNC: 0.85 MG/DL (ref 0.2–1)
BILIRUB UR QL STRIP: NEGATIVE
BUN SERPL-MCNC: 16 MG/DL (ref 5–25)
CALCIUM SERPL-MCNC: 9.1 MG/DL (ref 8.3–10.1)
CHLORIDE SERPL-SCNC: 106 MMOL/L (ref 100–108)
CLARITY UR: ABNORMAL
CO2 SERPL-SCNC: 29 MMOL/L (ref 21–32)
COLOR UR: YELLOW
CREAT SERPL-MCNC: 0.89 MG/DL (ref 0.6–1.3)
EOSINOPHIL # BLD AUTO: 0.12 THOUSAND/ΜL (ref 0–0.61)
EOSINOPHIL NFR BLD AUTO: 2 % (ref 0–6)
ERYTHROCYTE [DISTWIDTH] IN BLOOD BY AUTOMATED COUNT: 13.9 % (ref 11.6–15.1)
GFR SERPL CREATININE-BSD FRML MDRD: 85 ML/MIN/1.73SQ M
GLUCOSE P FAST SERPL-MCNC: 86 MG/DL (ref 65–99)
GLUCOSE UR STRIP-MCNC: NEGATIVE MG/DL
HCT VFR BLD AUTO: 42.6 % (ref 36.5–49.3)
HGB BLD-MCNC: 14.1 G/DL (ref 12–17)
HGB UR QL STRIP.AUTO: NEGATIVE
IMM GRANULOCYTES # BLD AUTO: 0.02 THOUSAND/UL (ref 0–0.2)
IMM GRANULOCYTES NFR BLD AUTO: 0 % (ref 0–2)
KETONES UR STRIP-MCNC: NEGATIVE MG/DL
LDLC SERPL DIRECT ASSAY-MCNC: 64 MG/DL (ref 0–100)
LEUKOCYTE ESTERASE UR QL STRIP: ABNORMAL
LYMPHOCYTES # BLD AUTO: 1.32 THOUSANDS/ΜL (ref 0.6–4.47)
LYMPHOCYTES NFR BLD AUTO: 22 % (ref 14–44)
MCH RBC QN AUTO: 30.9 PG (ref 26.8–34.3)
MCHC RBC AUTO-ENTMCNC: 33.1 G/DL (ref 31.4–37.4)
MCV RBC AUTO: 93 FL (ref 82–98)
MONOCYTES # BLD AUTO: 0.63 THOUSAND/ΜL (ref 0.17–1.22)
MONOCYTES NFR BLD AUTO: 11 % (ref 4–12)
NEUTROPHILS # BLD AUTO: 3.77 THOUSANDS/ΜL (ref 1.85–7.62)
NEUTS SEG NFR BLD AUTO: 65 % (ref 43–75)
NITRITE UR QL STRIP: NEGATIVE
NON-SQ EPI CELLS URNS QL MICRO: NORMAL /HPF
NRBC BLD AUTO-RTO: 0 /100 WBCS
PH UR STRIP.AUTO: 6.5 [PH]
PLATELET # BLD AUTO: 172 THOUSANDS/UL (ref 149–390)
PMV BLD AUTO: 10 FL (ref 8.9–12.7)
POTASSIUM SERPL-SCNC: 3.8 MMOL/L (ref 3.5–5.3)
PROT SERPL-MCNC: 6.9 G/DL (ref 6.4–8.2)
PROT UR STRIP-MCNC: NEGATIVE MG/DL
RBC # BLD AUTO: 4.56 MILLION/UL (ref 3.88–5.62)
RBC #/AREA URNS AUTO: NORMAL /HPF
SODIUM SERPL-SCNC: 138 MMOL/L (ref 136–145)
SP GR UR STRIP.AUTO: 1.02 (ref 1–1.03)
UROBILINOGEN UR QL STRIP.AUTO: 1 E.U./DL
WBC # BLD AUTO: 5.88 THOUSAND/UL (ref 4.31–10.16)
WBC #/AREA URNS AUTO: NORMAL /HPF

## 2021-06-07 PROCEDURE — 81001 URINALYSIS AUTO W/SCOPE: CPT | Performed by: INTERNAL MEDICINE

## 2021-06-07 PROCEDURE — 83721 ASSAY OF BLOOD LIPOPROTEIN: CPT

## 2021-06-07 PROCEDURE — 80053 COMPREHEN METABOLIC PANEL: CPT

## 2021-06-07 PROCEDURE — 85025 COMPLETE CBC W/AUTO DIFF WBC: CPT

## 2021-06-07 PROCEDURE — 36415 COLL VENOUS BLD VENIPUNCTURE: CPT

## 2021-09-11 DIAGNOSIS — N13.8 BPH WITH OBSTRUCTION/LOWER URINARY TRACT SYMPTOMS: ICD-10-CM

## 2021-09-11 DIAGNOSIS — N40.1 BPH WITH OBSTRUCTION/LOWER URINARY TRACT SYMPTOMS: ICD-10-CM

## 2021-10-27 RX ORDER — ALFUZOSIN HYDROCHLORIDE 10 MG/1
TABLET, EXTENDED RELEASE ORAL
Qty: 90 TABLET | Refills: 3 | Status: SHIPPED | OUTPATIENT
Start: 2021-10-27

## 2021-12-15 ENCOUNTER — APPOINTMENT (OUTPATIENT)
Dept: LAB | Age: 71
End: 2021-12-15
Payer: COMMERCIAL

## 2021-12-15 DIAGNOSIS — E55.9 VITAMIN D DEFICIENCY: ICD-10-CM

## 2021-12-15 DIAGNOSIS — Z51.81 ENCOUNTER FOR THERAPEUTIC DRUG MONITORING: ICD-10-CM

## 2021-12-15 DIAGNOSIS — Z79.899 ENCOUNTER FOR LONG-TERM (CURRENT) USE OF OTHER MEDICATIONS: ICD-10-CM

## 2021-12-15 DIAGNOSIS — E78.2 MIXED HYPERLIPIDEMIA: ICD-10-CM

## 2021-12-15 DIAGNOSIS — I25.10 CORONARY ARTERY DISEASE INVOLVING NATIVE CORONARY ARTERY OF NATIVE HEART WITHOUT ANGINA PECTORIS: ICD-10-CM

## 2021-12-15 LAB
25(OH)D3 SERPL-MCNC: 33.4 NG/ML (ref 30–100)
ALBUMIN SERPL BCP-MCNC: 3.6 G/DL (ref 3.5–5)
ALP SERPL-CCNC: 79 U/L (ref 46–116)
ALT SERPL W P-5'-P-CCNC: 29 U/L (ref 12–78)
ANION GAP SERPL CALCULATED.3IONS-SCNC: 1 MMOL/L (ref 4–13)
AST SERPL W P-5'-P-CCNC: 14 U/L (ref 5–45)
BASOPHILS # BLD AUTO: 0.02 THOUSANDS/ΜL (ref 0–0.1)
BASOPHILS NFR BLD AUTO: 0 % (ref 0–1)
BILIRUB SERPL-MCNC: 0.68 MG/DL (ref 0.2–1)
BUN SERPL-MCNC: 14 MG/DL (ref 5–25)
CALCIUM SERPL-MCNC: 9.7 MG/DL (ref 8.3–10.1)
CHLORIDE SERPL-SCNC: 105 MMOL/L (ref 100–108)
CHOLEST SERPL-MCNC: 114 MG/DL
CO2 SERPL-SCNC: 34 MMOL/L (ref 21–32)
CREAT SERPL-MCNC: 0.82 MG/DL (ref 0.6–1.3)
DIGOXIN SERPL-MCNC: 0.5 NG/ML (ref 0.8–2)
EOSINOPHIL # BLD AUTO: 0.13 THOUSAND/ΜL (ref 0–0.61)
EOSINOPHIL NFR BLD AUTO: 3 % (ref 0–6)
ERYTHROCYTE [DISTWIDTH] IN BLOOD BY AUTOMATED COUNT: 13.7 % (ref 11.6–15.1)
GFR SERPL CREATININE-BSD FRML MDRD: 87 ML/MIN/1.73SQ M
GLUCOSE P FAST SERPL-MCNC: 87 MG/DL (ref 65–99)
HCT VFR BLD AUTO: 43.1 % (ref 36.5–49.3)
HDLC SERPL-MCNC: 43 MG/DL
HGB BLD-MCNC: 14 G/DL (ref 12–17)
IMM GRANULOCYTES # BLD AUTO: 0.01 THOUSAND/UL (ref 0–0.2)
IMM GRANULOCYTES NFR BLD AUTO: 0 % (ref 0–2)
LDLC SERPL CALC-MCNC: 62 MG/DL (ref 0–100)
LDLC SERPL DIRECT ASSAY-MCNC: 56 MG/DL (ref 0–100)
LYMPHOCYTES # BLD AUTO: 1.22 THOUSANDS/ΜL (ref 0.6–4.47)
LYMPHOCYTES NFR BLD AUTO: 27 % (ref 14–44)
MCH RBC QN AUTO: 30.4 PG (ref 26.8–34.3)
MCHC RBC AUTO-ENTMCNC: 32.5 G/DL (ref 31.4–37.4)
MCV RBC AUTO: 94 FL (ref 82–98)
MONOCYTES # BLD AUTO: 0.5 THOUSAND/ΜL (ref 0.17–1.22)
MONOCYTES NFR BLD AUTO: 11 % (ref 4–12)
NEUTROPHILS # BLD AUTO: 2.69 THOUSANDS/ΜL (ref 1.85–7.62)
NEUTS SEG NFR BLD AUTO: 59 % (ref 43–75)
NONHDLC SERPL-MCNC: 71 MG/DL
NRBC BLD AUTO-RTO: 0 /100 WBCS
PLATELET # BLD AUTO: 179 THOUSANDS/UL (ref 149–390)
PMV BLD AUTO: 9.9 FL (ref 8.9–12.7)
POTASSIUM SERPL-SCNC: 4 MMOL/L (ref 3.5–5.3)
PROT SERPL-MCNC: 6.6 G/DL (ref 6.4–8.2)
RBC # BLD AUTO: 4.61 MILLION/UL (ref 3.88–5.62)
SODIUM SERPL-SCNC: 140 MMOL/L (ref 136–145)
TRIGL SERPL-MCNC: 47 MG/DL
WBC # BLD AUTO: 4.57 THOUSAND/UL (ref 4.31–10.16)

## 2021-12-15 PROCEDURE — 85025 COMPLETE CBC W/AUTO DIFF WBC: CPT

## 2021-12-15 PROCEDURE — 80053 COMPREHEN METABOLIC PANEL: CPT

## 2021-12-15 PROCEDURE — 36415 COLL VENOUS BLD VENIPUNCTURE: CPT

## 2021-12-15 PROCEDURE — 82306 VITAMIN D 25 HYDROXY: CPT

## 2021-12-15 PROCEDURE — 80162 ASSAY OF DIGOXIN TOTAL: CPT

## 2021-12-15 PROCEDURE — 80061 LIPID PANEL: CPT

## 2021-12-15 PROCEDURE — 83721 ASSAY OF BLOOD LIPOPROTEIN: CPT

## 2022-01-24 ENCOUNTER — APPOINTMENT (OUTPATIENT)
Dept: LAB | Age: 72
End: 2022-01-24
Payer: COMMERCIAL

## 2022-01-24 DIAGNOSIS — N40.1 BPH WITH OBSTRUCTION/LOWER URINARY TRACT SYMPTOMS: Primary | ICD-10-CM

## 2022-01-24 DIAGNOSIS — N13.8 BPH WITH OBSTRUCTION/LOWER URINARY TRACT SYMPTOMS: Primary | ICD-10-CM

## 2022-01-24 DIAGNOSIS — Z12.5 PROSTATE CANCER SCREENING: ICD-10-CM

## 2022-01-24 LAB — PSA SERPL-MCNC: 3.1 NG/ML (ref 0–4)

## 2022-01-24 PROCEDURE — G0103 PSA SCREENING: HCPCS

## 2022-01-24 PROCEDURE — 36415 COLL VENOUS BLD VENIPUNCTURE: CPT

## 2022-03-21 ENCOUNTER — APPOINTMENT (OUTPATIENT)
Dept: LAB | Age: 72
End: 2022-03-21
Payer: COMMERCIAL

## 2022-03-21 DIAGNOSIS — N40.1 BPH WITH OBSTRUCTION/LOWER URINARY TRACT SYMPTOMS: ICD-10-CM

## 2022-03-21 DIAGNOSIS — N13.8 BPH WITH OBSTRUCTION/LOWER URINARY TRACT SYMPTOMS: ICD-10-CM

## 2022-03-21 LAB — PSA SERPL-MCNC: 1.6 NG/ML (ref 0–4)

## 2022-03-21 PROCEDURE — 84153 ASSAY OF PSA TOTAL: CPT

## 2022-03-28 ENCOUNTER — OFFICE VISIT (OUTPATIENT)
Dept: UROLOGY | Facility: AMBULATORY SURGERY CENTER | Age: 72
End: 2022-03-28
Payer: COMMERCIAL

## 2022-03-28 VITALS
HEART RATE: 48 BPM | WEIGHT: 194 LBS | SYSTOLIC BLOOD PRESSURE: 122 MMHG | DIASTOLIC BLOOD PRESSURE: 72 MMHG | BODY MASS INDEX: 27.16 KG/M2 | HEIGHT: 71 IN

## 2022-03-28 DIAGNOSIS — Z12.5 PROSTATE CANCER SCREENING: Primary | ICD-10-CM

## 2022-03-28 PROCEDURE — 99213 OFFICE O/P EST LOW 20 MIN: CPT | Performed by: NURSE PRACTITIONER

## 2022-03-28 NOTE — PROGRESS NOTES
3/28/2022      Chief Complaint   Patient presents with    Follow-up    Benign Prostatic Hypertrophy     Assessment and Plan    66 y o  male managed by our office    1  Benign Prostatic Hyperplasia  · Continue alfuzosin  ·  continue to monitor for worsening/progression of urinary symptoms  ·  PSA performed 03/21/2022 resulted 1 6  ·  STAN- prostate 45 g with no nodule  ·  repeat PSA and STAN in 1 year with follow-up in the office at that time        History of Present Illness  Gurjit Cross is a 66 y o  male here for follow up evaluation of  urinary symptoms secondary to benign prostatic hyperplasia  Patient has been managed on alfuzosin daily with good control of urinary symptoms  He denies exacerbations of his urinary symptoms at this time  He is very happy with his current urinary status  He denies changes to his general health since his previous office visit  PSA trend below  Component PSA, Total   Latest Ref Rng & Units 0 0 - 4 0 ng/mL   1/22/2020 1 5   1/25/2021 1 6   1/24/2022 3 1   3/21/2022 1 6     Review of Systems   Constitutional: Negative for chills and fever  Respiratory: Negative for cough and shortness of breath  Cardiovascular: Negative for chest pain  Gastrointestinal: Negative for abdominal distention, abdominal pain, blood in stool, nausea and vomiting  Genitourinary: Negative for difficulty urinating, dysuria, enuresis, flank pain, frequency, hematuria and urgency  Skin: Negative for rash  Urinary Incontinence Screening      Most Recent Value   Urinary Incontinence    Urinary Incontinence? No   Incomplete emptying? Yes   Urinary frequency? Yes   Urinary urgency? Yes   Urinary hesitancy? No   Dysuria (painful difficult urination)? No   Nocturia (waking up to use the bathroom)? Yes  [1x]   Straining (having to push to go)?  Yes   Weak stream? Yes   Intermittent stream? Yes          AUA SYMPTOM SCORE      Most Recent Value   AUA SYMPTOM SCORE    How often have you had a sensation of not emptying your bladder completely after you finished urinating? 2   How often have you had to urinate again less than two hours after you finished urinating? 4   How often have you found you stopped and started again several times when you urinate? 2   How often have you found it difficult to postpone urination? 2   How often have you had a weak urinary stream? 2   How often have you had to push or strain to begin urination? 1   How many times did you most typically get up to urinate from the time you went to bed at night until the time you got up in the morning? 1   Quality of Life: If you were to spend the rest of your life with your urinary condition just the way it is now, how would you feel about that? 1   AUA SYMPTOM SCORE 14             Past Medical History  Past Medical History:   Diagnosis Date    Cancer ([facility] )     skin - squamos cell    GERD (gastroesophageal reflux disease)     Hearing aid worn     Bilateral    Heart disease     Hyperlipidemia     Hypertension     Nocturnal hypoxia     Use 2LNC Concentrated O2 at Night only    PAT [facility]paroxysmal atrial tachycardia) ([facility] )     Pneumonia 2016    PVC (premature ventricular contraction)     Tenosynovitis        Past Social History  Past Surgical History:   Procedure Laterality Date    CARDIAC SURGERY  1996    CATARACT EXTRACTION      COLONOSCOPY      CORONARY ARTERY BYPASS GRAFT      CYST REMOVAL      Chest    FOOT SURGERY Left 2001    KNEE SURGERY Left     as a child    NM COLONOSCOPY FLX DX W/COLLJ SPEC WHEN PFRMD N/A 3/27/2019    Procedure: COLONOSCOPY;  Surgeon: Raul Liz MD;  [facility];   Service: Gastroenterology    NM REPAIR [address] HERNIA,5+Y/O,REDUCIBL Left 2/22/2019    Procedure: REPAIR HERNIA INGUINAL;  Surgeon: Angela Amador MD;  [facility];  Service: General    TONSILLECTOMY      TOTAL HIP ARTHROPLASTY Right 11/2017     Social History     Tobacco Use   Smoking Status Never Smoker Smokeless Tobacco Never Used       Past Family History  Family History   Problem Relation Age of Onset    Heart disease Mother     Hypertension Mother     Other Son         Leiomyosarcoma       Past Social history  Social History     Socioeconomic History    Marital status: /Civil Union     Spouse name: Not on file    Number of children: Not on file    Years of education: Not on file    Highest education level: Not on file   Occupational History    Occupation: Retired   Tobacco Use    Smoking status: Never Smoker    Smokeless tobacco: Never Used   Vaping Use    Vaping Use: Never used   Substance and Sexual Activity    Alcohol use: No    Drug use: No    Sexual activity: Not on file   Other Topics Concern    Not on file   Social History Narrative    Not on file     Social Determinants of Health     Financial Resource Strain: Not on file   Food Insecurity: Not on file   Transportation Needs: Not on file   Physical Activity: Not on file   Stress: Not on file   Social Connections: Not on file   Intimate Partner Violence: Not on file   Housing Stability: Not on file       Current Medications  Current Outpatient Medications   Medication Sig Dispense Refill    acetaminophen (TYLENOL) 500 mg tablet Take 500 mg by mouth every 6 (six) hours as needed       alfuzosin (UROXATRAL) 10 mg 24 hr tablet TAKE 1 TABLET BY MOUTH  DAILY 90 tablet 3    aspirin (ECOTRIN LOW STRENGTH) 81 mg EC tablet Take 81 mg by mouth daily at bedtime       Cholecalciferol 2000 units CAPS Take 1 capsule by mouth daily in the early morning       Coenzyme Q10 (CO Q10 PO) Take 1 capsule by mouth daily in the early morning      DIGOX 125 MCG tablet Take 1 tablet by mouth daily in the early morning       FOLIC ACID PO Take 271 mcg by mouth daily in the early morning       multivitamin (THERAGRAN) TABS Take 1 tablet by mouth daily in the early morning       omeprazole (PriLOSEC) 40 MG capsule Take 40 mg by mouth daily in the early morning 1 po qd      pindolol (VISKEN) 5 mg tablet Take 5 mg by mouth daily in the early morning 1 po qam      ramipril (ALTACE) 5 mg capsule Take 5 mg by mouth daily at bedtime 1 po qhs      rosuvastatin (CRESTOR) 5 mg tablet TAKE 1 TABLET BY MOUTH EVERYDAY AT BEDTIME      Saw [place] 450 MG CAPS Take 1 capsule by mouth daily at bedtime 1 po qd       fluticasone-salmeterol (ADVAIR DISKUS) 500-50 mcg/dose inhaler as needed (Patient not taking: Reported on 3/28/2022 )      OXYGEN-HELIUM IN Inhale (Patient not taking: Reported on 3/28/2022 )       No current facility-administered medications for this visit  Allergies  Allergies   Allergen Reactions    Penicillins Rash    Shellfish-Derived Products - Food Allergy Other (See Comments)     Unknown         The following portions of the patient's history were reviewed and updated as appropriate: allergies, current medications, past medical history, past social history, past surgical history and problem list       Vitals  Vitals:    03/28/22 0815   BP: 122/72   Pulse: (!) 48   Weight: 88 kg (194 lb)   Height: 5' 11" (1 803 m)           Physical Exam  Physical Exam  Vitals reviewed  Constitutional:       General: He is not in acute distress  Appearance: Normal appearance  He is normal weight  HENT:      Head: Normocephalic  Pulmonary:      Effort: No respiratory distress  Breath sounds: Normal breath sounds  Genitourinary:     Comments: STAN- 45 g with no nodules   Skin:     General: Skin is warm and dry  Neurological:      General: No focal deficit present  Mental Status: He is alert and oriented to person, place, and time     Psychiatric:         Mood and Affect: Mood normal          Behavior: Behavior normal        Results  No results found for this or any previous visit (from the past 1 hour(s)) ]  Lab Results   Component Value Date    PSA 1 6 03/21/2022    PSA 3 1 01/24/2022    PSA 1 6 01/25/2021     Lab Results   Component Value Date    CALCIUM 9 7 12/15/2021    K 4 0 12/15/2021    CO2 34 (H) 12/15/2021     12/15/2021    BUN 14 12/15/2021    CREATININE 0 82 12/15/2021     Lab Results   Component Value Date    WBC 4 57 12/15/2021    HGB 14 0 12/15/2021    HCT 43 1 12/15/2021    MCV 94 12/15/2021     12/15/2021           Orders  Orders Placed This Encounter   Procedures    PSA, Total Screen     This is a patient instruction: This test is non-fasting  Please drink two glasses of water morning of bloodwork          Standing Status:   Future     Standing Expiration Date:   9/28/2023       MOON Estrada

## 2022-06-10 ENCOUNTER — ESTABLISHED COMPREHENSIVE EXAM (OUTPATIENT)
Dept: URBAN - METROPOLITAN AREA CLINIC 6 | Facility: CLINIC | Age: 72
End: 2022-06-10

## 2022-06-10 ENCOUNTER — APPOINTMENT (OUTPATIENT)
Dept: LAB | Age: 72
End: 2022-06-10
Payer: COMMERCIAL

## 2022-06-10 DIAGNOSIS — E78.49 OTHER HYPERLIPIDEMIA: ICD-10-CM

## 2022-06-10 DIAGNOSIS — I25.10 ATHEROSCLEROSIS OF NATIVE CORONARY ARTERY OF NATIVE HEART WITHOUT ANGINA PECTORIS: ICD-10-CM

## 2022-06-10 DIAGNOSIS — Z96.1: ICD-10-CM

## 2022-06-10 DIAGNOSIS — E55.9 AVITAMINOSIS D: ICD-10-CM

## 2022-06-10 DIAGNOSIS — I10 ESSENTIAL HYPERTENSION, MALIGNANT: ICD-10-CM

## 2022-06-10 LAB
25(OH)D3 SERPL-MCNC: 45.6 NG/ML (ref 30–100)
ALBUMIN SERPL BCP-MCNC: 3.5 G/DL (ref 3.5–5)
ALP SERPL-CCNC: 78 U/L (ref 46–116)
ALT SERPL W P-5'-P-CCNC: 22 U/L (ref 12–78)
ANION GAP SERPL CALCULATED.3IONS-SCNC: 0 MMOL/L (ref 4–13)
AST SERPL W P-5'-P-CCNC: 17 U/L (ref 5–45)
BASOPHILS # BLD AUTO: 0.03 THOUSANDS/ΜL (ref 0–0.1)
BASOPHILS NFR BLD AUTO: 1 % (ref 0–1)
BILIRUB SERPL-MCNC: 0.88 MG/DL (ref 0.2–1)
BUN SERPL-MCNC: 12 MG/DL (ref 5–25)
CALCIUM SERPL-MCNC: 9.3 MG/DL (ref 8.3–10.1)
CHLORIDE SERPL-SCNC: 106 MMOL/L (ref 100–108)
CHOLEST SERPL-MCNC: 111 MG/DL
CO2 SERPL-SCNC: 32 MMOL/L (ref 21–32)
CREAT SERPL-MCNC: 0.91 MG/DL (ref 0.6–1.3)
EOSINOPHIL # BLD AUTO: 0.15 THOUSAND/ΜL (ref 0–0.61)
EOSINOPHIL NFR BLD AUTO: 3 % (ref 0–6)
ERYTHROCYTE [DISTWIDTH] IN BLOOD BY AUTOMATED COUNT: 13.9 % (ref 11.6–15.1)
GFR SERPL CREATININE-BSD FRML MDRD: 83 ML/MIN/1.73SQ M
GLUCOSE P FAST SERPL-MCNC: 87 MG/DL (ref 65–99)
HCT VFR BLD AUTO: 42.5 % (ref 36.5–49.3)
HDLC SERPL-MCNC: 47 MG/DL
HGB BLD-MCNC: 14 G/DL (ref 12–17)
IMM GRANULOCYTES # BLD AUTO: 0.02 THOUSAND/UL (ref 0–0.2)
IMM GRANULOCYTES NFR BLD AUTO: 0 % (ref 0–2)
LDLC SERPL CALC-MCNC: 53 MG/DL (ref 0–100)
LDLC SERPL DIRECT ASSAY-MCNC: 61 MG/DL (ref 0–100)
LYMPHOCYTES # BLD AUTO: 1.32 THOUSANDS/ΜL (ref 0.6–4.47)
LYMPHOCYTES NFR BLD AUTO: 25 % (ref 14–44)
MCH RBC QN AUTO: 30 PG (ref 26.8–34.3)
MCHC RBC AUTO-ENTMCNC: 32.9 G/DL (ref 31.4–37.4)
MCV RBC AUTO: 91 FL (ref 82–98)
MONOCYTES # BLD AUTO: 0.67 THOUSAND/ΜL (ref 0.17–1.22)
MONOCYTES NFR BLD AUTO: 13 % (ref 4–12)
NEUTROPHILS # BLD AUTO: 3.08 THOUSANDS/ΜL (ref 1.85–7.62)
NEUTS SEG NFR BLD AUTO: 58 % (ref 43–75)
NONHDLC SERPL-MCNC: 64 MG/DL
NRBC BLD AUTO-RTO: 0 /100 WBCS
PLATELET # BLD AUTO: 175 THOUSANDS/UL (ref 149–390)
PMV BLD AUTO: 9.7 FL (ref 8.9–12.7)
POTASSIUM SERPL-SCNC: 4.5 MMOL/L (ref 3.5–5.3)
PROT SERPL-MCNC: 6.7 G/DL (ref 6.4–8.2)
RBC # BLD AUTO: 4.67 MILLION/UL (ref 3.88–5.62)
SODIUM SERPL-SCNC: 138 MMOL/L (ref 136–145)
TRIGL SERPL-MCNC: 56 MG/DL
WBC # BLD AUTO: 5.27 THOUSAND/UL (ref 4.31–10.16)

## 2022-06-10 PROCEDURE — 92014 COMPRE OPH EXAM EST PT 1/>: CPT

## 2022-06-10 PROCEDURE — 83721 ASSAY OF BLOOD LIPOPROTEIN: CPT

## 2022-06-10 PROCEDURE — 80053 COMPREHEN METABOLIC PANEL: CPT

## 2022-06-10 PROCEDURE — 85025 COMPLETE CBC W/AUTO DIFF WBC: CPT

## 2022-06-10 PROCEDURE — 80061 LIPID PANEL: CPT

## 2022-06-10 PROCEDURE — 82306 VITAMIN D 25 HYDROXY: CPT

## 2022-06-10 PROCEDURE — 36415 COLL VENOUS BLD VENIPUNCTURE: CPT

## 2022-06-10 ASSESSMENT — VISUAL ACUITY
OD_CC: 20/20
OS_CC: 20/20-1
OU_CC: J1+

## 2022-06-10 ASSESSMENT — TONOMETRY
OD_IOP_MMHG: 14
OS_IOP_MMHG: 16

## 2022-09-06 ENCOUNTER — TELEPHONE (OUTPATIENT)
Dept: OTHER | Facility: OTHER | Age: 72
End: 2022-09-06

## 2022-09-06 DIAGNOSIS — N13.8 BPH WITH OBSTRUCTION/LOWER URINARY TRACT SYMPTOMS: ICD-10-CM

## 2022-09-06 DIAGNOSIS — N40.1 BPH WITH OBSTRUCTION/LOWER URINARY TRACT SYMPTOMS: ICD-10-CM

## 2022-09-07 ENCOUNTER — OFFICE VISIT (OUTPATIENT)
Dept: SLEEP CENTER | Facility: CLINIC | Age: 72
End: 2022-09-07
Payer: COMMERCIAL

## 2022-09-07 VITALS — HEIGHT: 71 IN | SYSTOLIC BLOOD PRESSURE: 128 MMHG | DIASTOLIC BLOOD PRESSURE: 70 MMHG | BODY MASS INDEX: 27.06 KG/M2

## 2022-09-07 DIAGNOSIS — I10 ESSENTIAL HYPERTENSION: ICD-10-CM

## 2022-09-07 DIAGNOSIS — G47.33 OSA (OBSTRUCTIVE SLEEP APNEA): Primary | ICD-10-CM

## 2022-09-07 DIAGNOSIS — E66.3 OVERWEIGHT (BMI 25.0-29.9): ICD-10-CM

## 2022-09-07 DIAGNOSIS — G47.61 PLMD (PERIODIC LIMB MOVEMENT DISORDER): ICD-10-CM

## 2022-09-07 DIAGNOSIS — I49.3 PVC'S (PREMATURE VENTRICULAR CONTRACTIONS): ICD-10-CM

## 2022-09-07 DIAGNOSIS — R68.2 DRY MOUTH: ICD-10-CM

## 2022-09-07 PROCEDURE — 99214 OFFICE O/P EST MOD 30 MIN: CPT | Performed by: INTERNAL MEDICINE

## 2022-09-07 RX ORDER — ALFUZOSIN HYDROCHLORIDE 10 MG/1
10 TABLET, EXTENDED RELEASE ORAL DAILY
Qty: 90 TABLET | Refills: 3 | Status: SHIPPED | OUTPATIENT
Start: 2022-09-07

## 2022-09-07 NOTE — PROGRESS NOTES
Review of Systems      Genitourinary difficulty with erection   Cardiology none   Gastrointestinal none   Neurology numbness/tingling of an extremity   Constitutional none   Integumentary none   Psychiatry none   Musculoskeletal joint pain   Pulmonary none   ENT throat clearing and ringing in ears   Endocrine frequent urination   Hematological none

## 2022-09-07 NOTE — Clinical Note
Anita Barker does not allow me to sign this report    I get a message something is missing that you have to complete before I can sign

## 2022-09-07 NOTE — PATIENT INSTRUCTIONS

## 2022-09-07 NOTE — PROGRESS NOTES
Follow-Up Note - [facility]   Cesar Ibarra  66 y o  male  :1950  ZFR:[id number]  DOS:2022    CC: I saw this patient for follow-up in clinic today for Sleep disordered breathing, Coexisting Sleep and Medical Problems  He got a replacement dream Station machine from [employer] a around a week ago    Results of a split study in 2019: The diagnostic portion demonstrated an AHI of 40 5 per hour  There were also respiratory effort-related arousals resulting in an RDI of 43 5 per hour  Minimum oxygen saturation was 82% and 5 6% of the study spent with saturations below 90%  During the therapeutic portion of the study, sleep disordered breathing was sufficiently remediated with BiPAP at of over 8 cm H2O  There were moderate periodic limb movements of sleep    PFSH, Problem List, Medications & Allergies were reviewed in EMR  Interval changes: none reported  He  has a past medical history of Cancer ([facility] ), GERD (gastroesophageal reflux disease), Hearing aid worn, Heart disease, Hyperlipidemia, Hypertension, Nocturnal hypoxia, PAT (paroxysmal atrial tachycardia) ([facility] ), Pneumonia (), PVC (premature ventricular contraction), and Tenosynovitis  He has a current medication list which includes the following prescription(s): alfuzosin, aspirin, cholecalciferol, coenzyme I84, digox, folic acid, multivitamin, omeprazole, pindolol, ramipril, rosuvastatin, saw palmetto, acetaminophen, fluticasone-salmeterol, and oxygen-helium  PHYSIOLOGICAL DATA REVIEW AND INTERPRETATION:    using PAP > 4 hours/night 100%  Estimated ARIADNE 8 8/hour with pressure of 12/8cm H2O @90th/95th percentile; Patient has not been using ozone based devices to sanitize the machine  SUBJECTIVE: Regarding use of PAP, Zainab Porter reports:   · some adverse effects: dry mouth/throat; has not noticed any fibres or foreign material in air line       · He is benefiting from use: sleeping better   · Periodic limb movements are not disturbing sleep  Sleep Routine: Alexander Sibley reports getting 8 5 hrs sleep  ; he has no difficulty initiating or maintaining sleep   He arises spontaneously and feels refreshed  Alexander Sibley denies Excessive Daytime Sleepiness,   He rated himself at Total score: 2 /24 on the [place] Sleepiness Scale  Habits: reports that he has never smoked  He has never used smokeless tobacco ,  reports no history of alcohol use ,  reports no history of drug use , Caffeine use:limited ; Exercise routine: regular    ROS: reviewed & as attached  Significant for weight has been stable  He reports some postnasal drip  He reported no respiratory or cardiac symptoms  EXAM: /70   Ht 5' 11" (1 803 m)   BMI 27 06 kg/m²     Wt Readings from Last 3 Encounters:   03/28/22 88 kg (194 lb)   02/08/21 86 2 kg (190 lb)   11/02/20 87 5 kg (192 lb 12 8 oz)      Patient is well groomed; well appearing  CNS: Alert, orientated, clear & coherent speech  Psych: cooperativeand in no distress  Mental state:appears normal   H&N: EOMI; NC/AT:no facial pressure marks, no rashes  Skin/Extrem: col & hydration normal; no edema  Resp: Respiratory effort is normal  Physical findings otherwise essentially unchanged from previous  IMPRESSION: Problem List Items & Comorbidities Addressed this Visit    1  DUNG (obstructive sleep apnea)  PAP DME Pressure Change    PAP DME Resupply/Reorder   2  PLMD (periodic limb movement disorder)     3  Dry mouth     4  Essential hypertension     5  PVC's (premature ventricular contractions)     6  Overweight (BMI 25 0-29  9)         PLAN:  1  I reviewed results of prior studies and physiologic data with the patient  2  I discussed treatment options with risks and benefits  3  Treatment with  PAP is medically necessary and Alexander Sibley is agreable to continue use  4  Care of equipment, methods to improve comfort using PAP and importance of compliance with therapy were discussed    5  Pressure setting: change 13/9 cmH2O     6  Rx provided to replace  supplies and Care coordinated with DME provider  7  No medication is needed for the periodic limb movements of sleep  8  Discussed strategies for weight reduction  9  Follow-up is advised in 1 year or sooner if needed to monitor progress, compliance and to adjust therapy  Thank you for allowing me to participate in the care of this patient  Sincerely,     Authenticated electronically on 93/53/14   Board Certified Specialist     Portions of the record may have been created with voice recognition software  Occasional wrong word or "sound a like" substitutions may have occurred due to the inherent limitations of voice recognition software  There may also be notations and random deletions of words or characters from malfunctioning software  Read the chart carefully and recognize, using context, where substitutions/deletions have occurred

## 2022-09-08 ENCOUNTER — TELEPHONE (OUTPATIENT)
Dept: SLEEP CENTER | Facility: CLINIC | Age: 72
End: 2022-09-08

## 2022-09-08 LAB
DME PARACHUTE DELIVERY DATE REQUESTED: NORMAL
DME PARACHUTE ITEM DESCRIPTION: NORMAL
DME PARACHUTE ORDER STATUS: NORMAL
DME PARACHUTE SUPPLIER NAME: NORMAL
DME PARACHUTE SUPPLIER PHONE: NORMAL

## 2022-12-07 ENCOUNTER — APPOINTMENT (OUTPATIENT)
Dept: LAB | Age: 72
End: 2022-12-07

## 2022-12-07 DIAGNOSIS — E78.49 OTHER HYPERLIPIDEMIA: ICD-10-CM

## 2022-12-07 DIAGNOSIS — Z86.16 POST-COVID SYNDROME RESOLVED: ICD-10-CM

## 2022-12-07 DIAGNOSIS — I47.1 PAROXYSMAL SUPRAVENTRICULAR TACHYCARDIA (HCC): ICD-10-CM

## 2022-12-07 DIAGNOSIS — I49.3 VENTRICULAR PREMATURE BEATS: ICD-10-CM

## 2022-12-07 DIAGNOSIS — E55.9 AVITAMINOSIS D: ICD-10-CM

## 2022-12-07 LAB
25(OH)D3 SERPL-MCNC: 35.4 NG/ML (ref 30–100)
ALBUMIN SERPL BCP-MCNC: 3.2 G/DL (ref 3.5–5)
ALP SERPL-CCNC: 80 U/L (ref 46–116)
ALT SERPL W P-5'-P-CCNC: 24 U/L (ref 12–78)
ANION GAP SERPL CALCULATED.3IONS-SCNC: 5 MMOL/L (ref 4–13)
AST SERPL W P-5'-P-CCNC: 14 U/L (ref 5–45)
BASOPHILS # BLD AUTO: 0.02 THOUSANDS/ÂΜL (ref 0–0.1)
BASOPHILS NFR BLD AUTO: 0 % (ref 0–1)
BILIRUB SERPL-MCNC: 0.83 MG/DL (ref 0.2–1)
BUN SERPL-MCNC: 13 MG/DL (ref 5–25)
CALCIUM ALBUM COR SERPL-MCNC: 9.7 MG/DL (ref 8.3–10.1)
CALCIUM SERPL-MCNC: 9.1 MG/DL (ref 8.3–10.1)
CHLORIDE SERPL-SCNC: 106 MMOL/L (ref 96–108)
CO2 SERPL-SCNC: 28 MMOL/L (ref 21–32)
CREAT SERPL-MCNC: 0.77 MG/DL (ref 0.6–1.3)
CRP SERPL QL: <3 MG/L
EOSINOPHIL # BLD AUTO: 0.1 THOUSAND/ÂΜL (ref 0–0.61)
EOSINOPHIL NFR BLD AUTO: 2 % (ref 0–6)
ERYTHROCYTE [DISTWIDTH] IN BLOOD BY AUTOMATED COUNT: 13.8 % (ref 11.6–15.1)
GFR SERPL CREATININE-BSD FRML MDRD: 89 ML/MIN/1.73SQ M
GLUCOSE P FAST SERPL-MCNC: 85 MG/DL (ref 65–99)
HCT VFR BLD AUTO: 39.8 % (ref 36.5–49.3)
HGB BLD-MCNC: 13.1 G/DL (ref 12–17)
IMM GRANULOCYTES # BLD AUTO: 0.03 THOUSAND/UL (ref 0–0.2)
IMM GRANULOCYTES NFR BLD AUTO: 1 % (ref 0–2)
LDLC SERPL DIRECT ASSAY-MCNC: 56 MG/DL (ref 0–100)
LYMPHOCYTES # BLD AUTO: 1.32 THOUSANDS/ÂΜL (ref 0.6–4.47)
LYMPHOCYTES NFR BLD AUTO: 25 % (ref 14–44)
MCH RBC QN AUTO: 30.5 PG (ref 26.8–34.3)
MCHC RBC AUTO-ENTMCNC: 32.9 G/DL (ref 31.4–37.4)
MCV RBC AUTO: 93 FL (ref 82–98)
MONOCYTES # BLD AUTO: 0.71 THOUSAND/ÂΜL (ref 0.17–1.22)
MONOCYTES NFR BLD AUTO: 13 % (ref 4–12)
NEUTROPHILS # BLD AUTO: 3.16 THOUSANDS/ÂΜL (ref 1.85–7.62)
NEUTS SEG NFR BLD AUTO: 59 % (ref 43–75)
NRBC BLD AUTO-RTO: 0 /100 WBCS
PLATELET # BLD AUTO: 172 THOUSANDS/UL (ref 149–390)
PMV BLD AUTO: 9.8 FL (ref 8.9–12.7)
POTASSIUM SERPL-SCNC: 3.8 MMOL/L (ref 3.5–5.3)
PROT SERPL-MCNC: 6.7 G/DL (ref 6.4–8.4)
RBC # BLD AUTO: 4.3 MILLION/UL (ref 3.88–5.62)
SODIUM SERPL-SCNC: 139 MMOL/L (ref 135–147)
WBC # BLD AUTO: 5.34 THOUSAND/UL (ref 4.31–10.16)

## 2023-02-07 ENCOUNTER — TELEPHONE (OUTPATIENT)
Dept: SLEEP CENTER | Facility: CLINIC | Age: 73
End: 2023-02-07

## 2023-02-27 ENCOUNTER — APPOINTMENT (OUTPATIENT)
Dept: LAB | Age: 73
End: 2023-02-27

## 2023-02-27 DIAGNOSIS — Z12.5 PROSTATE CANCER SCREENING: ICD-10-CM

## 2023-02-27 LAB — PSA SERPL-MCNC: 1.6 NG/ML (ref 0–4)

## 2023-03-08 ENCOUNTER — OFFICE VISIT (OUTPATIENT)
Dept: UROLOGY | Facility: AMBULATORY SURGERY CENTER | Age: 73
End: 2023-03-08

## 2023-03-08 VITALS
HEART RATE: 58 BPM | WEIGHT: 190 LBS | OXYGEN SATURATION: 95 % | BODY MASS INDEX: 26.5 KG/M2 | SYSTOLIC BLOOD PRESSURE: 108 MMHG | DIASTOLIC BLOOD PRESSURE: 62 MMHG

## 2023-03-08 DIAGNOSIS — Z12.5 PROSTATE CANCER SCREENING: ICD-10-CM

## 2023-03-08 DIAGNOSIS — N40.1 BPH WITH OBSTRUCTION/LOWER URINARY TRACT SYMPTOMS: Primary | ICD-10-CM

## 2023-03-08 DIAGNOSIS — N13.8 BPH WITH OBSTRUCTION/LOWER URINARY TRACT SYMPTOMS: Primary | ICD-10-CM

## 2023-03-08 LAB
POST-VOID RESIDUAL VOLUME, ML POC: 27 ML
SL AMB  POCT GLUCOSE, UA: NORMAL
SL AMB LEUKOCYTE ESTERASE,UA: NORMAL
SL AMB POCT BILIRUBIN,UA: NORMAL
SL AMB POCT BLOOD,UA: NORMAL
SL AMB POCT CLARITY,UA: CLEAR
SL AMB POCT COLOR,UA: NORMAL
SL AMB POCT KETONES,UA: NORMAL
SL AMB POCT NITRITE,UA: NORMAL
SL AMB POCT PH,UA: 5
SL AMB POCT SPECIFIC GRAVITY,UA: 1.03
SL AMB POCT URINE PROTEIN: NORMAL
SL AMB POCT UROBILINOGEN: 0.2

## 2023-03-08 RX ORDER — ZINC GLUCONATE 50 MG
50 TABLET ORAL EVERY OTHER DAY
COMMUNITY

## 2023-03-08 NOTE — PROGRESS NOTES
3/8/2023    Assessment and Plan    74 y o  male managed by our office    1  Benign prostatic hyperplasia  · Continue alfuzosin  · We will continue to monitor/progression of lower urinary tract symptoms  · PSA performed 2/27/2023 resulted 1 6  · STAN-prostate approximate 45 g with no nodules  · Repeat PSA and STAN in 1 year    History of Present Illness  Adeline Peace is a 74 y o  male here for follow up evaluation of urinary symptoms secondary to benign prostatic hyperplasia   Patient has been managed on alfuzosin daily with good control of urinary symptoms   He denies exacerbations of his urinary symptoms at this time  Cristiane Green is very happy with his current urinary status   He denies changes to his general health since his previous office visit   PSA trend below        Component       PSA, Total   Latest Ref Rng & Units       0 0 - 4 0 ng/mL   1/22/2020      10:28 AM 1 5   1/25/2021      7:12 AM 1 6   1/24/2022      6:54 AM 3 1   3/21/2022      6:51 AM 1 6   2/27/2023      6:39 AM 1 6       Review of Systems   Constitutional: Negative for chills and fever  Respiratory: Negative for cough and shortness of breath  Cardiovascular: Negative for chest pain  Gastrointestinal: Negative for abdominal distention, abdominal pain, blood in stool, nausea and vomiting  Genitourinary: Negative for difficulty urinating, dysuria, enuresis, flank pain, frequency, hematuria and urgency  Skin: Negative for rash             AUA SYMPTOM SCORE    Flowsheet Row Most Recent Value   AUA SYMPTOM SCORE    How often have you had a sensation of not emptying your bladder completely after you finished urinating? 3 (P)     How often have you had to urinate again less than two hours after you finished urinating? 2 (P)     How often have you found you stopped and started again several times when you urinate? 2 (P)     How often have you found it difficult to postpone urination? 2 (P)     How often have you had a weak urinary stream? 3 (P) How often have you had to push or strain to begin urination? 1 (P)     How many times did you most typically get up to urinate from the time you went to bed at night until the time you got up in the morning? 2 (P)     Quality of Life: If you were to spend the rest of your life with your urinary condition just the way it is now, how would you feel about that? 2 (P)     AUA SYMPTOM SCORE 15 (P)              Past Medical History  Past Medical History:   Diagnosis Date   • Cancer ([facility], [place] [address] )     skin - squamos cell   • GERD (gastroesophageal reflux disease)    • Hearing aid worn     Bilateral   • Heart disease    • Hyperlipidemia    • Hypertension    • Nocturnal hypoxia     Use 2LNC Concentrated O2 at Night only   • PAT (paroxysmal atrial tachycardia) (HCC)    • Pneumonia 2016   • PVC (premature ventricular contraction)    • Tenosynovitis        Past Social History  Past Surgical History:   Procedure Laterality Date   • CARDIAC SURGERY  1996   • CATARACT EXTRACTION     • COLONOSCOPY     • CORONARY ARTERY BYPASS GRAFT     • CYST REMOVAL      Chest   • FOOT SURGERY Left 2001   • KNEE SURGERY Left     as a child   • LA COLONOSCOPY FLX DX W/COLLJ SPEC WHEN PFRMD N/A 3/27/2019    Procedure: COLONOSCOPY;  Surgeon: Ayush Gonzalez MD;  Location: [facility];   Service: Gastroenterology   • LA RPR 1ST INGUN HRNA AGE 5 YRS/> REDUCIBLE Left 2/22/2019    Procedure: REPAIR HERNIA INGUINAL;  Surgeon: Kartik Obando MD;  Location: [facility];  Service: General   • TONSILLECTOMY     • TOTAL HIP ARTHROPLASTY Right 11/2017     Social History     Tobacco Use   Smoking Status Never   Smokeless Tobacco Never       Past Family History  Family History   Problem Relation Age of Onset   • Heart disease Mother    • Hypertension Mother    • Other Son         Leiomyosarcoma       Past Social history  Social History     Socioeconomic History   • Marital status: /Civil Union     Spouse name: Not on file   • Number of children: Not on file   • Years of education: Not on file   • Highest education level: Not on file   Occupational History   • Occupation: Retired   Tobacco Use   • Smoking status: Never   • Smokeless tobacco: Never   Vaping Use   • Vaping Use: Never used   Substance and Sexual Activity   • Alcohol use: No   • Drug use: No   • Sexual activity: Not on file   Other Topics Concern   • Not on file   Social History Narrative   • Not on file     Social Determinants of Health     Financial Resource Strain: Not on file   Food Insecurity: Not on file   Transportation Needs: Not on file   Physical Activity: Not on file   Stress: Not on file   Social Connections: Not on file   Intimate Partner Violence: Not on file   Housing Stability: Not on file       Current Medications  Current Outpatient Medications   Medication Sig Dispense Refill   • alfuzosin (UROXATRAL) 10 mg 24 hr tablet Take 1 tablet (10 mg total) by mouth daily 90 tablet 3   • aspirin (ECOTRIN LOW STRENGTH) 81 mg EC tablet Take 81 mg by mouth daily at bedtime      • Cholecalciferol 2000 units CAPS Take 1 capsule by mouth daily in the early morning      • Coenzyme Q10 (CO Q10 PO) Take 1 capsule by mouth daily in the early morning     • DIGOX 125 MCG tablet Take 1 tablet by mouth daily in the early morning      • FOLIC ACID PO Take 379 mcg by mouth daily in the early morning      • multivitamin (THERAGRAN) TABS Take 1 tablet by mouth daily in the early morning      • omeprazole (PriLOSEC) 40 MG capsule Take 40 mg by mouth daily in the early morning 1 po qd     • pindolol (VISKEN) 5 mg tablet Take 5 mg by mouth daily in the early morning 1 po qam     • ramipril (ALTACE) 5 mg capsule Take 5 mg by mouth daily at bedtime 1 po qhs     • rosuvastatin (CRESTOR) 5 mg tablet TAKE 1 TABLET BY MOUTH EVERYDAY AT BEDTIME     • Saw Palmetto 450 MG CAPS Take 1 capsule by mouth daily at bedtime 1 po qd      • zinc gluconate 50 mg tablet Take 50 mg by mouth every other day       No current facility-administered medications for this visit  Allergies  Allergies   Allergen Reactions   • Penicillins Rash   • Shellfish-Derived Products - Food Allergy Other (See Comments)     Unknown         The following portions of the patient's history were reviewed and updated as appropriate: allergies, current medications, past medical history, past social history, past surgical history and problem list       Vitals  Vitals:    03/08/23 1302   BP: 108/62   BP Location: Left arm   Patient Position: Sitting   Cuff Size: Large   Pulse: 58   SpO2: 95%   Weight: 86 2 kg (190 lb)           Physical Exam  Physical Exam  Vitals reviewed  Constitutional:       General: He is not in acute distress  Appearance: Normal appearance  He is normal weight  HENT:      Head: Normocephalic  Eyes:      Pupils: Pupils are equal, round, and reactive to light  Cardiovascular:      Rate and Rhythm: Normal rate  Pulmonary:      Effort: No respiratory distress  Breath sounds: Normal breath sounds  Genitourinary:     Comments: STAN-prostate 45 g  Skin:     General: Skin is warm and dry  Neurological:      General: No focal deficit present  Mental Status: He is alert and oriented to person, place, and time     Psychiatric:         Mood and Affect: Mood normal          Behavior: Behavior normal            Results  No results found for this or any previous visit (from the past 1 hour(s)) ]  Lab Results   Component Value Date    PSA 1 6 02/27/2023    PSA 1 6 03/21/2022    PSA 3 1 01/24/2022     Lab Results   Component Value Date    CALCIUM 9 1 12/07/2022    K 3 8 12/07/2022    CO2 28 12/07/2022     12/07/2022    BUN 13 12/07/2022    CREATININE 0 77 12/07/2022     Lab Results   Component Value Date    WBC 5 34 12/07/2022    HGB 13 1 12/07/2022    HCT 39 8 12/07/2022    MCV 93 12/07/2022     12/07/2022           Orders  Orders Placed This Encounter   Procedures   • PSA, Total Screen     This is a patient instruction: This test is non-fasting  Please drink two glasses of water morning of bloodwork          Standing Status:   Future     Standing Expiration Date:   9/8/2024   • POCT Measure PVR   • POCT urine dip       MOON Hamlin

## 2023-07-19 DIAGNOSIS — N13.8 BPH WITH OBSTRUCTION/LOWER URINARY TRACT SYMPTOMS: ICD-10-CM

## 2023-07-19 DIAGNOSIS — N40.1 BPH WITH OBSTRUCTION/LOWER URINARY TRACT SYMPTOMS: ICD-10-CM

## 2023-07-19 RX ORDER — ALFUZOSIN HYDROCHLORIDE 10 MG/1
TABLET, EXTENDED RELEASE ORAL
Qty: 90 TABLET | Refills: 3 | Status: SHIPPED | OUTPATIENT
Start: 2023-07-19

## 2023-10-31 ENCOUNTER — ESTABLISHED COMPREHENSIVE EXAM (OUTPATIENT)
Dept: URBAN - METROPOLITAN AREA CLINIC 6 | Facility: CLINIC | Age: 73
End: 2023-10-31

## 2023-10-31 DIAGNOSIS — Z96.1: ICD-10-CM

## 2023-10-31 PROCEDURE — 92014 COMPRE OPH EXAM EST PT 1/>: CPT

## 2023-10-31 ASSESSMENT — TONOMETRY
OS_IOP_MMHG: 9
OD_IOP_MMHG: 10

## 2023-10-31 ASSESSMENT — VISUAL ACUITY
OS_SC: 20/20-1
OD_SC: 20/25

## 2024-02-14 ENCOUNTER — APPOINTMENT (OUTPATIENT)
Dept: LAB | Age: 74
End: 2024-02-14
Payer: COMMERCIAL

## 2024-02-14 DIAGNOSIS — Z12.5 PROSTATE CANCER SCREENING: ICD-10-CM

## 2024-02-14 LAB — PSA SERPL-MCNC: 1.98 NG/ML (ref 0–4)

## 2024-02-14 PROCEDURE — 36415 COLL VENOUS BLD VENIPUNCTURE: CPT

## 2024-02-14 PROCEDURE — G0103 PSA SCREENING: HCPCS

## 2024-02-29 ENCOUNTER — OFFICE VISIT (OUTPATIENT)
Dept: UROLOGY | Facility: CLINIC | Age: 74
End: 2024-02-29
Payer: COMMERCIAL

## 2024-02-29 VITALS
DIASTOLIC BLOOD PRESSURE: 70 MMHG | SYSTOLIC BLOOD PRESSURE: 122 MMHG | WEIGHT: 190 LBS | BODY MASS INDEX: 26.6 KG/M2 | HEIGHT: 71 IN

## 2024-02-29 DIAGNOSIS — N40.1 BPH WITH OBSTRUCTION/LOWER URINARY TRACT SYMPTOMS: Primary | ICD-10-CM

## 2024-02-29 DIAGNOSIS — N13.8 BPH WITH OBSTRUCTION/LOWER URINARY TRACT SYMPTOMS: Primary | ICD-10-CM

## 2024-02-29 PROCEDURE — 99213 OFFICE O/P EST LOW 20 MIN: CPT | Performed by: PHYSICIAN ASSISTANT

## 2024-02-29 NOTE — PROGRESS NOTES
UROLOGY PROGRESS NOTE   Patient Identifiers: Paresh Phillips (MRN [id number])  Date of Service: 2/29/2024    Subjective:   73-year-old man history of BPH.  He has been on alfuzosin.  Last PSA 1.98.  Voiding pattern has been stable.    Reason for visit: BPH follow-up    Objective:     VITALS:    Vitals:    02/29/24 0755   BP: 122/70           LABS:  Lab Results   Component Value Date    HGB 13.1 12/07/2022    HCT 39.8 12/07/2022    WBC 5.34 12/07/2022     12/07/2022   ]    Lab Results   Component Value Date    K 4.2 12/20/2023     12/20/2023    CO2 33 (H) 12/20/2023    BUN 15 12/20/2023    CREATININE 0.89 12/20/2023    CALCIUM 9.0 12/20/2023   ]        INPATIENT MEDS:    Current Outpatient Medications:     alfuzosin (UROXATRAL) 10 mg 24 hr tablet, TAKE 1 TABLET BY MOUTH  DAILY, Disp: 90 tablet, Rfl: 3    aspirin (ECOTRIN LOW STRENGTH) 81 mg EC tablet, Take 81 mg by mouth daily at bedtime , Disp: , Rfl:     Cholecalciferol 2000 units CAPS, Take 1 capsule by mouth daily in the early morning , Disp: , Rfl:     Coenzyme Q10 (CO Q10 PO), Take 1 capsule by mouth daily in the early morning, Disp: , Rfl:     DIGOX 125 MCG tablet, Take 1 tablet by mouth daily in the early morning , Disp: , Rfl:     FOLIC ACID PO, Take 400 mcg by mouth daily in the early morning , Disp: , Rfl:     multivitamin (THERAGRAN) TABS, Take 1 tablet by mouth daily in the early morning , Disp: , Rfl:     omeprazole (PriLOSEC) 40 MG capsule, Take 40 mg by mouth daily in the early morning 1 po qd, Disp: , Rfl:     pindolol (VISKEN) 5 mg tablet, Take 5 mg by mouth daily in the early morning 1 po qam, Disp: , Rfl:     ramipril (ALTACE) 5 mg capsule, Take 5 mg by mouth daily at bedtime 1 po qhs, Disp: , Rfl:     rosuvastatin (CRESTOR) 5 mg tablet, TAKE 1 TABLET BY MOUTH EVERYDAY AT BEDTIME, Disp: , Rfl:     Saw Palmetto 450 MG CAPS, Take 1 capsule by mouth daily at bedtime 1 po qd , Disp: , Rfl:       Physical Exam:   /70 (BP Location: Left  "arm, Patient Position: Sitting, Cuff Size: Adult)   Ht 5' 11\" (1.803 m)   Wt 86.2 kg (190 lb)   BMI 26.50 kg/m²   GEN: no acute distress    RESP: breathing comfortably with no accessory muscle use    ABD: soft, non-tender, non-distended   INCISION:    EXT: no significant peripheral edema   (Male): Penis circumcised, phallus normal, meatus patent.  Testicles descended into scrotum bilaterally without masses nor tenderness.  No inguinal hernias bilaterally.  STAN: Prostate is enlarged at 40 grams. The prostate is not boggy. The prostate is not tender.  No nodules noted      RADIOLOGY:   None    Assessment:   #1.  BPH    Plan:   -Follow-up in 1 year for his annual exam  -  -  -          "

## 2024-06-24 DIAGNOSIS — N40.1 BPH WITH OBSTRUCTION/LOWER URINARY TRACT SYMPTOMS: ICD-10-CM

## 2024-06-24 DIAGNOSIS — N13.8 BPH WITH OBSTRUCTION/LOWER URINARY TRACT SYMPTOMS: ICD-10-CM

## 2024-06-25 RX ORDER — ALFUZOSIN HYDROCHLORIDE 10 MG/1
TABLET, EXTENDED RELEASE ORAL
Qty: 90 TABLET | Refills: 1 | Status: SHIPPED | OUTPATIENT
Start: 2024-06-25

## 2024-09-05 ENCOUNTER — OFFICE VISIT (OUTPATIENT)
Dept: SLEEP CENTER | Facility: CLINIC | Age: 74
End: 2024-09-05
Payer: COMMERCIAL

## 2024-09-05 VITALS
WEIGHT: 181 LBS | OXYGEN SATURATION: 98 % | HEART RATE: 58 BPM | DIASTOLIC BLOOD PRESSURE: 60 MMHG | HEIGHT: 71 IN | SYSTOLIC BLOOD PRESSURE: 120 MMHG | BODY MASS INDEX: 25.34 KG/M2

## 2024-09-05 DIAGNOSIS — J30.9 ALLERGIC RHINITIS WITH POSTNASAL DRIP: ICD-10-CM

## 2024-09-05 DIAGNOSIS — E66.3 OVERWEIGHT (BMI 25.0-29.9): ICD-10-CM

## 2024-09-05 DIAGNOSIS — R09.82 ALLERGIC RHINITIS WITH POSTNASAL DRIP: ICD-10-CM

## 2024-09-05 DIAGNOSIS — G47.61 PLMD (PERIODIC LIMB MOVEMENT DISORDER): ICD-10-CM

## 2024-09-05 DIAGNOSIS — I49.3 PVC'S (PREMATURE VENTRICULAR CONTRACTIONS): ICD-10-CM

## 2024-09-05 DIAGNOSIS — I10 ESSENTIAL HYPERTENSION: ICD-10-CM

## 2024-09-05 DIAGNOSIS — R68.2 DRY MOUTH: ICD-10-CM

## 2024-09-05 DIAGNOSIS — G47.33 OSA (OBSTRUCTIVE SLEEP APNEA): Primary | ICD-10-CM

## 2024-09-05 PROCEDURE — G2211 COMPLEX E/M VISIT ADD ON: HCPCS | Performed by: INTERNAL MEDICINE

## 2024-09-05 PROCEDURE — 99214 OFFICE O/P EST MOD 30 MIN: CPT | Performed by: INTERNAL MEDICINE

## 2024-09-05 NOTE — PROGRESS NOTES
Follow[facility] Note - [facility]   Paresh Phillips  73 y.o. male  :1950  MRN:[id number]  DOS:2024    CC: I saw this patient for follow-up in clinic today for Sleep disordered breathing, Coexisting Sleep and Medical Problems.. Interval changes: None reported.      Results of a split study in 2019: The diagnostic portion demonstrated an AHI of 40.5 per hour. There were also respiratory effort-related arousals resulting in an RDI of 43.5 per hour. Minimum oxygen saturation was 82% and 5.6% of the study spent with saturations below 90%. During the therapeutic portion of the study, sleep disordered breathing was sufficiently remediated with BiPAP at of over 8 cm H2O. There were moderate periodic limb movements of sleep    PFSH, Problem List, Medications & Allergies were reviewed in EMR.   He  has a past medical history of Cancer (HCC), GERD (gastroesophageal reflux disease), Hearing aid worn, Heart disease, Hyperlipidemia, Hypertension, Nocturnal hypoxia, PAT (paroxysmal atrial tachycardia), Pneumonia (), PVC (premature ventricular contraction), and Tenosynovitis.    He has a current medication list which includes the following prescription(s): alfuzosin, aspirin, cholecalciferol, coenzyme q10, digox, folic acid, multivitamin, omeprazole, pindolol, ramipril, rosuvastatin, and saw palmetto.    PHYSIOLOGICAL DATA REVIEW : No data was available, but he reports regular use of the device for > 4 hours/night. ;  Patient has not been using non FDA approved devices to sanitize the machine.  INTERPRETATION: Based on previous data and his report, compliance is excellent; Pressure setting is:in acceptable range; ;   SUBJECTIVE: With respect to use of PAP, Paresh  is experiencing some adverse effects:dry mouth/throat, mask leaks , and mask causes redness / facial marks .He derives benefit.  Is satisfied with sleep and daytime function.   Sleep Routine: Paresh reports getting 7-8 hrs sleep; he has no difficulty initiating or  "maintaining sleep . He arises spontaneously and feels refreshed.Paresh denies excessive daytime sleepiness,.  He rated himself at Total score: 3 /24 on the [place] Sleepiness Scale.   Other issues: Periodic limb movements are not disturbing sleep    Habits: Reports that he has never smoked. He has never used smokeless tobacco.,  Reports no history of alcohol use.,  Reports no history of drug use., Caffeine use:very little until  ; Exercise routine: regular.      ROS: Significant for some intentional weight reduction.  He has nasal symptoms and postnasal drip episodically.  He reported no respiratory or cardiac symptoms..    EXAM: /60   Pulse 58   Ht 5' 11\" (1.803 m)   Wt 82.1 kg (181 lb)   SpO2 98%   BMI 25.24 kg/m²     Wt Readings from Last 3 Encounters:   09/05/24 82.1 kg (181 lb)   02/29/24 86.2 kg (190 lb)   03/08/23 86.2 kg (190 lb)      Patient is well groomed; well appearing.   CNS: Alert, orientated, speech clear & coherent  Psych: cooperative and in no distress. Mental state: Appears normal.  H&N: EOMI; NC/AT: No pressure marks over nasal bridge, no rashes.    Skin/Extrem: col & hydration normal; no edema  Resp: Respiratory effort is normal  Physical findings otherwise essentially unchanged from previous.    IMPRESSION: Problem List Items & Comorbidities Addressed this Visit    1. DUNG (obstructive sleep apnea)  PAP DME Resupply/Reorder      2. PLMD (periodic limb movement disorder)        3. Dry mouth        4. Allergic rhinitis with postnasal drip        5. Essential hypertension        6. PVC's (premature ventricular contractions)        7. Overweight (BMI 25.0-29.9)            PLAN:  I reviewed results of prior studies and physiologic data with the patient.   I discussed treatment options with risks and benefits.  Treatment with  PAP is medically necessary and Paresh is agreable to continue use.   Care of equipment, methods to improve comfort using PAP and importance of compliance with therapy " "were discussed.  Pressure setting: continue 13/9 cmH2O.    Rx provided to replace supplies and Care coordinated with DME provider.  He will need to contact DME provider to reset his modem to facilitate any pressure adjustments.  The patient's current unit has now reached its 5 yr reasonable, useful life and needs to be replaced.   Nasal symptoms may improve with regular nasal saline rinse 1-2 times a day (Neilmed or Ayrs Sinus Rinse), followed by topical nasal steroid (e..g. OTC Flonase, Nasacort, Rhinocort) once a day if necessary.  Discussed strategies for weight reduction.    Follow-up is advised in 1 year or sooner if needed to monitor progress, compliance and to adjust therapy.     Thank you for allowing me to participate in the care of this patient.    Sincerely,     Authenticated electronically on 09/05/24   Board Certified Specialist     Portions of the record may have been created with voice recognition software. Occasional wrong word or \"sound a like\" substitutions may have occurred due to the inherent limitations of voice recognition software. There may also be notations and random deletions of words or characters from malfunctioning software. Read the chart carefully and recognize, using context, where substitutions/deletions have occurred.    "

## 2024-09-07 ENCOUNTER — TELEPHONE (OUTPATIENT)
Dept: SLEEP CENTER | Facility: CLINIC | Age: 74
End: 2024-09-07

## 2024-09-10 ENCOUNTER — TELEPHONE (OUTPATIENT)
Dept: SLEEP CENTER | Facility: CLINIC | Age: 74
End: 2024-09-10

## 2024-10-02 DIAGNOSIS — N40.1 BPH WITH OBSTRUCTION/LOWER URINARY TRACT SYMPTOMS: ICD-10-CM

## 2024-10-02 DIAGNOSIS — N13.8 BPH WITH OBSTRUCTION/LOWER URINARY TRACT SYMPTOMS: ICD-10-CM

## 2024-10-02 RX ORDER — ALFUZOSIN HYDROCHLORIDE 10 MG/1
TABLET, EXTENDED RELEASE ORAL
Qty: 90 TABLET | Refills: 1 | Status: SHIPPED | OUTPATIENT
Start: 2024-10-02

## 2024-11-29 LAB
DME PARACHUTE DELIVERY DATE REQUESTED: NORMAL
DME PARACHUTE DELIVERY DATE REQUESTED: NORMAL
DME PARACHUTE ITEM DESCRIPTION: NORMAL
DME PARACHUTE ORDER STATUS: NORMAL
DME PARACHUTE ORDER STATUS: NORMAL
DME PARACHUTE SUPPLIER NAME: NORMAL
DME PARACHUTE SUPPLIER NAME: NORMAL
DME PARACHUTE SUPPLIER PHONE: NORMAL
DME PARACHUTE SUPPLIER PHONE: NORMAL

## 2024-12-16 ENCOUNTER — OFFICE VISIT (OUTPATIENT)
Dept: CARDIOLOGY CLINIC | Facility: CLINIC | Age: 74
End: 2024-12-16
Payer: COMMERCIAL

## 2024-12-16 VITALS
DIASTOLIC BLOOD PRESSURE: 68 MMHG | BODY MASS INDEX: 25.38 KG/M2 | WEIGHT: 182 LBS | SYSTOLIC BLOOD PRESSURE: 114 MMHG | HEART RATE: 48 BPM

## 2024-12-16 DIAGNOSIS — Z95.1 HX OF CABG: ICD-10-CM

## 2024-12-16 DIAGNOSIS — G47.33 OSA (OBSTRUCTIVE SLEEP APNEA): Primary | ICD-10-CM

## 2024-12-16 DIAGNOSIS — I47.19 PAT (PAROXYSMAL ATRIAL TACHYCARDIA) (HCC): ICD-10-CM

## 2024-12-16 DIAGNOSIS — I25.10 CORONARY ARTERY DISEASE INVOLVING NATIVE CORONARY ARTERY OF NATIVE HEART WITHOUT ANGINA PECTORIS: ICD-10-CM

## 2024-12-16 DIAGNOSIS — I77.810 ECTATIC THORACIC AORTA (HCC): ICD-10-CM

## 2024-12-16 PROCEDURE — 93000 ELECTROCARDIOGRAM COMPLETE: CPT | Performed by: INTERNAL MEDICINE

## 2024-12-16 PROCEDURE — 99204 OFFICE O/P NEW MOD 45 MIN: CPT | Performed by: INTERNAL MEDICINE

## 2024-12-16 RX ORDER — ROSUVASTATIN CALCIUM 10 MG/1
10 TABLET, COATED ORAL
Qty: 90 TABLET | Refills: 3 | Status: SHIPPED | OUTPATIENT
Start: 2024-12-16

## 2024-12-16 RX ORDER — RAMIPRIL 5 MG/1
5 CAPSULE ORAL
Qty: 90 CAPSULE | Refills: 3 | Status: SHIPPED | OUTPATIENT
Start: 2024-12-16

## 2024-12-16 RX ORDER — DIGOXIN 125 UG/1
125 TABLET ORAL
Qty: 90 TABLET | Refills: 3 | Status: SHIPPED | OUTPATIENT
Start: 2024-12-16

## 2024-12-16 RX ORDER — PINDOLOL 5 MG/1
5 TABLET ORAL 2 TIMES DAILY
Qty: 180 TABLET | Refills: 3 | Status: SHIPPED | OUTPATIENT
Start: 2024-12-16

## 2024-12-16 NOTE — PROGRESS NOTES
[facility] Cardiology  Office Consultation  Paresh Phillips 74 y.o. male MRN: [id number]        Chief Complaint    Chief Complaint   Patient presents with    Advice Only     Establishing care, preious Dr. Leslie pt.   No current cardiac concerns.        Referring Provider: Self, Referral    Impression & Plan:    1. Coronary artery disease involving native coronary artery of native heart without angina pectoris  With distant CABG at age 45  We will aim for LDL goal < 55 mg/dL. Increase rosuvastatin to 10 mg daily.   - pindolol (VISKEN) 5 mg tablet; Take 1 tablet (5 mg total) by mouth 2 (two) times a day  Dispense: 180 tablet; Refill: 3  - rosuvastatin (CRESTOR) 10 MG tablet; Take 1 tablet (10 mg total) by mouth daily at bedtime  Dispense: 90 tablet; Refill: 3    2. PAT (paroxysmal atrial tachycardia) (HCC)  This has been managed for years on pindolol and digoxin with good relief. Continue current therapy.   - Digox 125 MCG tablet; Take 1 tablet (125 mcg total) by mouth daily in the early morning  Dispense: 90 tablet; Refill: 3    3. Ectatic thoracic aorta (HCC)  Stable mild ectasia. Will repeat echo in 6-12 months.   - pindolol (VISKEN) 5 mg tablet; Take 1 tablet (5 mg total) by mouth 2 (two) times a day  Dispense: 180 tablet; Refill: 3  - ramipril (ALTACE) 5 mg capsule; Take 1 capsule (5 mg total) by mouth daily at bedtime  Dispense: 90 capsule; Refill: 3    4. DUNG (obstructive sleep apnea) (Primary)  - POCT ECG    5. Hx of CABG  - rosuvastatin (CRESTOR) 10 MG tablet; Take 1 tablet (10 mg total) by mouth daily at bedtime  Dispense: 90 tablet; Refill: 3        We will see Paresh Phillips back in 6 months for routine follow-up.    HPI: Paresh Phillips is a 74 y.o. year old male with CAD s/p CABG in 1996, PVCs, HTN, thoracic aortic ectasia presenting to establish care.    Previously seen by Dr. Anuj Miller of [facility], who recently retirned.     No complaints today. He volunteers for [email or web address] and goes to the gym without  cardiac symptoms.       Cardiac testing:     Echo January 4, 2024:  Normal left ventricular systolic function, EF ~55% with normal diastolic function  Mild left atrial enlargement  Mildly ectatic aortic root   Mildly dilated thoracic aorta   Mild aortic sclerosis with trivial aortic regurgitation  Minimally thickened mitral valve with mild mitral regurgitation  Compared to prior study 12/17/2022, there have been no major changes.       48-hour Holter monitor January 3, 2024:  1. Dominant rhythm was sinus bradycardia. Ectopic Atrial Beats (EAB) and Ectopic Atrial Runs (EA Run) were noted. Average heart rate was 54 bpm, minimum heart rate was 37 bpm (12/21/23 at 11:07 pm), and maximum heart rate was 99 bpm.   2. No significant pauses. NE and QRS intervals were within normal limits. Of note: QT interval was 0.58 s and QTc interval was 0.55 s.   3. Total PAC burden was 4%. Isolated PACs and PAC couplets were noted. There were 2 atrial tachycardia episodes. The longest/fastest episode was 6 beats at an avg heart rate of 128 bpm up to 135 bpm.   4. Total PVC burden was 0%. No PVCs or complex ventricular arrhythmias were noted.   5. Circadian pattern was normal.   6. Diary submitted. Patient symptoms correlated to sinus bradycardia.     Chest CT June 13, 2022: TA stable, 4.1 x 3.9 cm.      CT chest wo contrast 6/18/24  Order: [id number]  Impression  Impression:  1.  No acute abnormality within the chest within the limitations of this  unenhanced study.  2.  Fusiform aneurysmal dilatation of ascending thoracic aorta measuring 4.3 cm  maximally, similar to slightly increased in the interim. Mild atherosclerosis.  3.  Scattered subcentimeter pulmonary nodules measuring greater than 5 mm in  diameter, all of which are unchanged since the previous examination.  4.  Other findings as above.      EKG reviewed personally:   12/16/24 - sinus bradycardia, 48 bpm, nonspecific ST abnl, abnormal study      Relevant Laboratory  Studies:  (Laboratory studies personally reviewed)    LDL direct 6/10/24 - 62 mg/dL  Lipid panel 6/10/24 - , TG 62, HDL 49, LDL 60 mg/dL   CMP 6/10/24 - K 4.5, Cr 0.86    Review of Systems   Constitutional:  Negative for activity change and unexpected weight change.   HENT:  Negative for facial swelling.    Eyes:  Negative for visual disturbance.   Respiratory:  Negative for cough and chest tightness.    Cardiovascular:  Negative for chest pain.   Gastrointestinal:  Negative for blood in stool.   Musculoskeletal:  Negative for myalgias.   Skin:  Negative for pallor.   Allergic/Immunologic: Negative for immunocompromised state.   Neurological:  Negative for syncope and light-headedness.   Psychiatric/Behavioral:  Negative for agitation and hallucinations.          Past Medical History:   Diagnosis Date    Cancer (HCC)     skin - squamos cell    GERD (gastroesophageal reflux disease)     Hearing aid worn     Bilateral    Heart disease     Hyperlipidemia     Hypertension     Nocturnal hypoxia     Use 2LNC Concentrated O2 at Night only    PAT (paroxysmal atrial tachycardia) (HCC)     Pneumonia 2016    PVC (premature ventricular contraction)     Tenosynovitis      Past Surgical History:   Procedure Laterality Date    CARDIAC SURGERY  1996    CATARACT EXTRACTION      COLONOSCOPY      CORONARY ARTERY BYPASS GRAFT      CYST REMOVAL      Chest    FOOT SURGERY Left 2001    KNEE SURGERY Left     as a child    [place] COLONOSCOPY FLX DX W/COLLJ SPEC WHEN PFRMD N/A 3/27/2019    Procedure: COLONOSCOPY;  Surgeon: Nick Allen MD;  [facility];  Service: Gastroenterology    NV RPR 1ST INGUN HRNA AGE 5 YRS/> REDUCIBLE Left 2/22/2019    Procedure: REPAIR HERNIA INGUINAL;  Surgeon: Charles Elizondo MD;  [facility];  Service: General    TONSILLECTOMY      TOTAL HIP ARTHROPLASTY Right 11/2017     Social History     Substance and Sexual Activity   Alcohol Use No     Social History     Substance and Sexual Activity    Drug Use No     Social History     Tobacco Use   Smoking Status Never   Smokeless Tobacco Never     Family History   Problem Relation Age of Onset    Heart disease Mother     Hypertension Mother     Other Son         Leiomyosarcoma       Allergies:  Allergies   Allergen Reactions    Penicillins Rash    Shellfish-Derived Products - Food Allergy Other (See Comments)     Unknown       Medications (as of START of this encounter):   Outpatient Medications Prior to Visit   Medication Sig Dispense Refill    alfuzosin (UROXATRAL) 10 mg 24 hr tablet TAKE 1 TABLET BY MOUTH DAILY 90 tablet 1    aspirin (ECOTRIN LOW STRENGTH) 81 mg EC tablet Take 81 mg by mouth daily at bedtime       Cholecalciferol 2000 units CAPS Take 1 capsule by mouth daily in the early morning       Coenzyme Q10 (CO Q10 PO) Take 1 capsule by mouth daily in the early morning      DIGOX 125 MCG tablet Take 1 tablet by mouth daily in the early morning       FOLIC ACID PO Take 400 mcg by mouth daily in the early morning       multivitamin (THERAGRAN) TABS Take 1 tablet by mouth daily in the early morning       omeprazole (PriLOSEC) 40 MG capsule Take 40 mg by mouth daily in the early morning 1 po qd      pindolol (VISKEN) 5 mg tablet Take 5 mg by mouth daily in the early morning 1 po qam      ramipril (ALTACE) 5 mg capsule Take 5 mg by mouth daily at bedtime 1 po qhs      rosuvastatin (CRESTOR) 5 mg tablet TAKE 1 TABLET BY MOUTH EVERYDAY AT BEDTIME      Saw [place] 450 MG CAPS Take 1 capsule by mouth daily at bedtime 1 po qd        No facility-administered medications prior to visit.         Vitals:    12/16/24 0839   BP: 114/68   Pulse: (!) 48     Weight (last 2 days)       Date/Time Weight    12/16/24 0839 82.6 (182)            General: Paresh Phillips is a well appearing male, in no acute distress, sitting comfortably  HEENT: moist mucous membranes, EOMI  Neck:  No JVD, supple, trachea midline   Cardiovascular: unremarkable S1/S2, regular rate and rhythm, no  "murmurs, rubs or gallops   Pulmonary: normal respiratory effort, CTAB   Abdomen: soft and nondistended  Extremities: No lower extremity edema. Warm and well perfused extremities.   Neuro: no focal motor deficits, AAOx3 (person, place, time)  Psych: Normal mood and affect, cooperative          Arnel Castle MD    This note was completed in part utilizing [employer] recognition software. Grammatical errors, random word insertion, spelling mistakes, occasional wrong word or \"sound-alike\" substitutions and incomplete sentences may be an occasional consequence of the system secondary to software limitations, ambient noise and hardware issues. At the time of dictation, efforts were made to edit, clarify and /or correct errors.  Please read the chart carefully and recognize, using context, where substitutions have occurred.  If you have any questions or concerns about the context, text or information contained within the body of this dictation, please contact myself, the provider, for further clarification.    "

## 2024-12-16 NOTE — LETTER
December 16, 2024     Cyril Cheung DO  [address] [place] [id number]    Patient: Paresh Phillips   YOB: 1950   Date of Visit: 12/16/2024       Dear Dr. Cheung:    Thank you for referring Paresh Phillips to me for evaluation. Below are my notes for this consultation.    If you have questions, please do not hesitate to call me. I look forward to following your patient along with you.         Sincerely,        Arnel Castle MD        CC: No Recipients    Arnel Castle MD  12/16/2024  9:22 AM  Incomplete  [facility] Cardiology  Office Consultation  Paresh Phillips 74 y.o. male MRN: [id number]        Chief Complaint    Chief Complaint   Patient presents with   • Advice Only     Establishing care, preious Dr. Leslie pt.   No current cardiac concerns.        Referring Provider: Self, Referral    Impression & Plan:    ***      We will see Paresh Phillips back in *** months for *** routine follow-up.    HPI: Paresh Phillips is a 74 y.o. year old male with CAD s/p CABG in 1996, PVCs, HTN, thoracic aortic ectasia presenting to establish care.    Previously seen by Dr. Aunj Miller of [facility], who recently retirned.     No complaints today. He volunteers for [employer] and goes to the gym without cardiac symptoms.       Cardiac testing:     Echo January 4, 2024:  Normal left ventricular systolic function, EF ~55% with normal diastolic function  Mild left atrial enlargement  Mildly ectatic aortic root   Mildly dilated thoracic aorta   Mild aortic sclerosis with trivial aortic regurgitation  Minimally thickened mitral valve with mild mitral regurgitation  Compared to prior study 12/17/2022, there have been no major changes.       48-hour Holter monitor January 3, 2024:  1. Dominant rhythm was sinus bradycardia. Ectopic Atrial Beats (EAB) and Ectopic Atrial Runs (EA Run) were noted. Average heart rate was 54 bpm, minimum heart rate was 37 bpm (12/21/23 at 11:07 pm), and maximum heart rate was 99 bpm.    2. No significant pauses. WI and QRS intervals were within normal limits. Of note: QT interval was 0.58 s and QTc interval was 0.55 s.   3. Total PAC burden was 4%. Isolated PACs and PAC couplets were noted. There were 2 atrial tachycardia episodes. The longest/fastest episode was 6 beats at an avg heart rate of 128 bpm up to 135 bpm.   4. Total PVC burden was 0%. No PVCs or complex ventricular arrhythmias were noted.   5. Circadian pattern was normal.   6. Diary submitted. Patient symptoms correlated to sinus bradycardia.     Chest CT June 13, 2022: TA stable, 4.1 x 3.9 cm.      CT chest wo contrast 6/18/24  Order: [id number]  Impression  Impression:  1.  No acute abnormality within the chest within the limitations of this  unenhanced study.  2.  Fusiform aneurysmal dilatation of ascending thoracic aorta measuring 4.3 cm  maximally, similar to slightly increased in the interim. Mild atherosclerosis.  3.  Scattered subcentimeter pulmonary nodules measuring greater than 5 mm in  diameter, all of which are unchanged since the previous examination.  4.  Other findings as above.      EKG reviewed personally:   ***      Relevant Laboratory Studies:  (Laboratory studies personally reviewed)    LDL direct 6/10/24 - 62 mg/dL  Lipid panel 6/10/24 - , TG 62, HDL 49, LDL 60 mg/dL   CMP 6/10/24 - K 4.5, Cr 0.86    Review of Systems      Past Medical History:   Diagnosis Date   • Cancer (HCC)     skin - squamos cell   • GERD (gastroesophageal reflux disease)    • Hearing aid worn     Bilateral   • Heart disease    • Hyperlipidemia    • Hypertension    • Nocturnal hypoxia     Use 2LNC Concentrated O2 at Night only   • PAT (paroxysmal atrial tachycardia) (HCC)    • Pneumonia 2016   • PVC (premature ventricular contraction)    • Tenosynovitis      Past Surgical History:   Procedure Laterality Date   • CARDIAC SURGERY  1996   • CATARACT EXTRACTION     • COLONOSCOPY     • CORONARY ARTERY BYPASS GRAFT     • CYST REMOVAL       Chest   • FOOT SURGERY Left 2001   • KNEE SURGERY Left     as a child   • [place] COLONOSCOPY FLX DX W/COLLJ SPEC WHEN PFRMD N/A 3/27/2019    Procedure: COLONOSCOPY;  Surgeon: Nick Allen MD;  [facility];  Service: Gastroenterology   • WA RPR 1ST INGUN HRNA AGE 5 YRS/> REDUCIBLE Left 2/22/2019    Procedure: REPAIR HERNIA INGUINAL;  Surgeon: Charles Elizondo MD;  [facility];  Service: General   • TONSILLECTOMY     • TOTAL HIP ARTHROPLASTY Right 11/2017     Social History     Substance and Sexual Activity   Alcohol Use No     Social History     Substance and Sexual Activity   Drug Use No     Social History     Tobacco Use   Smoking Status Never   Smokeless Tobacco Never     Family History   Problem Relation Age of Onset   • Heart disease Mother    • Hypertension Mother    • Other Son         Leiomyosarcoma       Allergies:  Allergies   Allergen Reactions   • Penicillins Rash   • Shellfish-Derived Products - Food Allergy Other (See Comments)     Unknown       Medications (as of START of this encounter):   Outpatient Medications Prior to Visit   Medication Sig Dispense Refill   • alfuzosin (UROXATRAL) 10 mg 24 hr tablet TAKE 1 TABLET BY MOUTH DAILY 90 tablet 1   • aspirin (ECOTRIN LOW STRENGTH) 81 mg EC tablet Take 81 mg by mouth daily at bedtime      • Cholecalciferol 2000 units CAPS Take 1 capsule by mouth daily in the early morning      • Coenzyme Q10 (CO Q10 PO) Take 1 capsule by mouth daily in the early morning     • DIGOX 125 MCG tablet Take 1 tablet by mouth daily in the early morning      • FOLIC ACID PO Take 400 mcg by mouth daily in the early morning      • multivitamin (THERAGRAN) TABS Take 1 tablet by mouth daily in the early morning      • omeprazole (PriLOSEC) 40 MG capsule Take 40 mg by mouth daily in the early morning 1 po qd     • pindolol (VISKEN) 5 mg tablet Take 5 mg by mouth daily in the early morning 1 po qam     • ramipril (ALTACE) 5 mg capsule Take 5 mg by mouth daily at  "bedtime 1 po qhs     • rosuvastatin (CRESTOR) 5 mg tablet TAKE 1 TABLET BY MOUTH EVERYDAY AT BEDTIME     • Saw [place] 450 MG CAPS Take 1 capsule by mouth daily at bedtime 1 po qd        No facility-administered medications prior to visit.         Vitals:    12/16/24 0839   BP: 114/68   Pulse: (!) 48     Weight (last 2 days)       Date/Time Weight    12/16/24 0839 82.6 (182)            General: Paresh Phillips is a well appearing male, in no acute distress, sitting comfortably  HEENT: moist mucous membranes, EOMI  Neck:  No JVD, supple, trachea midline ***  Cardiovascular: unremarkable S1/S2, regular rate and rhythm, no murmurs, rubs or gallops ***  Pulmonary: normal respiratory effort, CTAB ***  Abdomen: soft and nondistended  Extremities: No lower extremity edema. Warm and well perfused extremities. ***  Neuro: no focal motor deficits, AAOx3 (person, place, time)  Psych: Normal mood and affect, cooperative          Arnel Castle MD    This note was completed in part utilizing [employer] recognition software. Grammatical errors, random word insertion, spelling mistakes, occasional wrong word or \"sound-alike\" substitutions and incomplete sentences may be an occasional consequence of the system secondary to software limitations, ambient noise and hardware issues. At the time of dictation, efforts were made to edit, clarify and /or correct errors.  Please read the chart carefully and recognize, using context, where substitutions have occurred.  If you have any questions or concerns about the context, text or information contained within the body of this dictation, please contact myself, the provider, for further clarification.      Arnel Castle MD  12/16/2024  8:54 AM  Sign when Signing Visit  [facility]  Office Consultation  Paresh Phillips 74 y.o. male MRN: [id number]        Chief Complaint    Chief Complaint   Patient presents with   • Advice Only     Establishing care, preious Dr. Leslie pt. "   No current cardiac concerns.        Referring Provider: Self, Referral    Impression & Plan:    ***      We will see Paresh Phillips back in *** months for *** routine follow-up.    HPI: Paresh Phillips is a 74 y.o. year old male with CAD s/p CABG, PVCs, HTN, thoracic aortic ectasia presenting to establish care.    Previously seen by [facility] Anuj [facility] of [facility], who recently retirned.           Cardiac testing:     Echo January 4, 2024:  Normal left ventricular systolic function, EF ~55% with normal diastolic function  Mild left atrial enlargement  Mildly ectatic aortic root   Mildly dilated thoracic aorta   Mild aortic sclerosis with trivial aortic regurgitation  Minimally thickened mitral valve with mild mitral regurgitation  Compared to prior study 12/17/2022, there have been no major changes.       48-hour Holter monitor January 3, 2024:  1. Dominant rhythm was sinus bradycardia. Ectopic Atrial Beats (EAB) and Ectopic Atrial Runs (EA Run) were noted. Average heart rate was 54 bpm, minimum heart rate was 37 bpm (12/21/23 at 11:07 pm), and maximum heart rate was 99 bpm.   2. No significant pauses. WI and QRS intervals were within normal limits. Of note: QT interval was 0.58 s and QTc interval was 0.55 s.   3. Total PAC burden was 4%. Isolated PACs and PAC couplets were noted. There were 2 atrial tachycardia episodes. The longest/fastest episode was 6 beats at an avg heart rate of 128 bpm up to 135 bpm.   4. Total PVC burden was 0%. No PVCs or complex ventricular arrhythmias were noted.   5. Circadian pattern was normal.   6. Diary submitted. Patient symptoms correlated to sinus bradycardia.     Chest CT June 13, 2022: TA stable, 4.1 x 3.9 cm.      CT chest wo contrast 6/18/24  Order: [id number]  Impression  Impression:  1.  No acute abnormality within the chest within the limitations of this  unenhanced study.  2.  Fusiform aneurysmal dilatation of ascending thoracic aorta measuring 4.3 cm  maximally, similar to slightly  increased in the interim. Mild atherosclerosis.  3.  Scattered subcentimeter pulmonary nodules measuring greater than 5 mm in  diameter, all of which are unchanged since the previous examination.  4.  Other findings as above.      EKG reviewed personally:   ***      Relevant Laboratory Studies:  (Laboratory studies personally reviewed)    LDL direct 6/10/24 - 62 mg/dL  Lipid panel 6/10/24 - , TG 62, HDL 49, LDL 60 mg/dL   CMP 6/10/24 - K 4.5, Cr 0.86    Review of Systems      Past Medical History:   Diagnosis Date   • Cancer (HCC)     skin - squamos cell   • GERD (gastroesophageal reflux disease)    • Hearing aid worn     Bilateral   • Heart disease    • Hyperlipidemia    • Hypertension    • Nocturnal hypoxia     Use 2LNC Concentrated O2 at Night only   • PAT (paroxysmal atrial tachycardia) (HCC)    • Pneumonia 2016   • PVC (premature ventricular contraction)    • Tenosynovitis      Past Surgical History:   Procedure Laterality Date   • CARDIAC SURGERY  1996   • CATARACT EXTRACTION     • COLONOSCOPY     • CORONARY ARTERY BYPASS GRAFT     • CYST REMOVAL      Chest   • FOOT SURGERY Left 2001   • KNEE SURGERY Left     as a child   • [place] COLONOSCOPY FLX DX W/COLLJ SPEC WHEN PFRMD N/A 3/27/2019    Procedure: COLONOSCOPY;  Surgeon: Nick Allen MD;  [facility];  Service: Gastroenterology   • [place] RPR 1ST INGUN HRNA AGE 5 YRS/> REDUCIBLE Left 2/22/2019    Procedure: REPAIR HERNIA INGUINAL;  Surgeon: Charles Elizondo MD;  [facility];  Service: General   • TONSILLECTOMY     • TOTAL HIP ARTHROPLASTY Right 11/2017     Social History     Substance and Sexual Activity   Alcohol Use No     Social History     Substance and Sexual Activity   Drug Use No     Social History     Tobacco Use   Smoking Status Never   Smokeless Tobacco Never     Family History   Problem Relation Age of Onset   • Heart disease Mother    • Hypertension Mother    • Other Son         Leiomyosarcoma       Allergies:  Allergies    Allergen Reactions   • Penicillins Rash   • Shellfish-Derived Products - Food Allergy Other (See Comments)     Unknown       Medications (as of START of this encounter):   Outpatient Medications Prior to Visit   Medication Sig Dispense Refill   • alfuzosin (UROXATRAL) 10 mg 24 hr tablet TAKE 1 TABLET BY MOUTH DAILY 90 tablet 1   • aspirin (ECOTRIN LOW STRENGTH) 81 mg EC tablet Take 81 mg by mouth daily at bedtime      • Cholecalciferol 2000 units CAPS Take 1 capsule by mouth daily in the early morning      • Coenzyme Q10 (CO Q10 PO) Take 1 capsule by mouth daily in the early morning     • DIGOX 125 MCG tablet Take 1 tablet by mouth daily in the early morning      • FOLIC ACID PO Take 400 mcg by mouth daily in the early morning      • multivitamin (THERAGRAN) TABS Take 1 tablet by mouth daily in the early morning      • omeprazole (PriLOSEC) 40 MG capsule Take 40 mg by mouth daily in the early morning 1 po qd     • pindolol (VISKEN) 5 mg tablet Take 5 mg by mouth daily in the early morning 1 po qam     • ramipril (ALTACE) 5 mg capsule Take 5 mg by mouth daily at bedtime 1 po qhs     • rosuvastatin (CRESTOR) 5 mg tablet TAKE 1 TABLET BY MOUTH EVERYDAY AT BEDTIME     • Saw [place] 450 MG CAPS Take 1 capsule by mouth daily at bedtime 1 po qd        No facility-administered medications prior to visit.         Vitals:    12/16/24 0839   BP: 114/68   Pulse: (!) 48     Weight (last 2 days)       Date/Time Weight    12/16/24 0839 82.6 (182)            General: Paresh Phillips is a well appearing male, in no acute distress, sitting comfortably  HEENT: moist mucous membranes, EOMI  Neck:  No JVD, supple, trachea midline ***  Cardiovascular: unremarkable S1/S2, regular rate and rhythm, no murmurs, rubs or gallops ***  Pulmonary: normal respiratory effort, CTAB ***  Abdomen: soft and nondistended  Extremities: No lower extremity edema. Warm and well perfused extremities. ***  Neuro: no focal motor deficits, AAOx3 (person, place,  "time)  Psych: Normal mood and affect, cooperative          Arnel Castle MD    This note was completed in part utilizing [employer] recognition software. Grammatical errors, random word insertion, spelling mistakes, occasional wrong word or \"sound-alike\" substitutions and incomplete sentences may be an occasional consequence of the system secondary to software limitations, ambient noise and hardware issues. At the time of dictation, efforts were made to edit, clarify and /or correct errors.  Please read the chart carefully and recognize, using context, where substitutions have occurred.  If you have any questions or concerns about the context, text or information contained within the body of this dictation, please contact myself, the provider, for further clarification.      "

## 2025-01-14 LAB
DME PARACHUTE DELIVERY DATE ACTUAL: NORMAL
DME PARACHUTE DELIVERY DATE REQUESTED: NORMAL
DME PARACHUTE ITEM DESCRIPTION: NORMAL
DME PARACHUTE ORDER STATUS: NORMAL
DME PARACHUTE SUPPLIER NAME: NORMAL
DME PARACHUTE SUPPLIER PHONE: NORMAL

## 2025-02-20 ENCOUNTER — OFFICE VISIT (OUTPATIENT)
Dept: SLEEP CENTER | Facility: CLINIC | Age: 75
End: 2025-02-20
Payer: COMMERCIAL

## 2025-02-20 ENCOUNTER — TELEPHONE (OUTPATIENT)
Dept: SLEEP CENTER | Facility: CLINIC | Age: 75
End: 2025-02-20

## 2025-02-20 VITALS
SYSTOLIC BLOOD PRESSURE: 120 MMHG | OXYGEN SATURATION: 98 % | BODY MASS INDEX: 26.04 KG/M2 | DIASTOLIC BLOOD PRESSURE: 60 MMHG | HEART RATE: 45 BPM | WEIGHT: 186 LBS | HEIGHT: 71 IN

## 2025-02-20 DIAGNOSIS — I10 ESSENTIAL HYPERTENSION: ICD-10-CM

## 2025-02-20 DIAGNOSIS — G47.33 OSA (OBSTRUCTIVE SLEEP APNEA): Primary | ICD-10-CM

## 2025-02-20 DIAGNOSIS — G47.61 PERIODIC LIMB MOVEMENTS OF SLEEP: ICD-10-CM

## 2025-02-20 DIAGNOSIS — R09.82 ALLERGIC RHINITIS WITH POSTNASAL DRIP: ICD-10-CM

## 2025-02-20 DIAGNOSIS — R68.2 DRY MOUTH: ICD-10-CM

## 2025-02-20 DIAGNOSIS — I49.3 PVC'S (PREMATURE VENTRICULAR CONTRACTIONS): ICD-10-CM

## 2025-02-20 DIAGNOSIS — J30.9 ALLERGIC RHINITIS WITH POSTNASAL DRIP: ICD-10-CM

## 2025-02-20 LAB

## 2025-02-20 PROCEDURE — 99214 OFFICE O/P EST MOD 30 MIN: CPT | Performed by: INTERNAL MEDICINE

## 2025-02-20 PROCEDURE — G2211 COMPLEX E/M VISIT ADD ON: HCPCS | Performed by: INTERNAL MEDICINE

## 2025-02-20 NOTE — PROGRESS NOTES
Name: Paresh Phillips      : 1950      MRN: [id number]  Encounter Provider: Broderick Ceja MD  Encounter Date: 2025   Encounter department: [facility]  :  Assessment & Plan  DUNG (obstructive sleep apnea)    Orders:    PAP DME Pressure Change    PAP DME Resupply/Reorder    Periodic limb movements of sleep         Dry mouth         Allergic rhinitis with postnasal drip         Essential hypertension         PVC's (premature ventricular contractions)         BMI 25.0-25.9,adult          PLAN:   Problems & Comorbidities Addressed this Visit as listed.  Stable/controlled conditions reviewed in notes.  I reviewed results of prior studies and physiologic data with the patient.   I discussed treatment options with risks and benefits.  Treatment with  PAP is medically necessary and Paresh is agreable to continue use.   Care of equipment, methods to improve comfort using PAP and importance of compliance with therapy were discussed.  Pressure setting: change BiPAP auto15/9/4 cmH2O using a nasal interface.    Rx provided to replace supplies and Care coordinated with DME provider.   No medication is needed for periodic limb movements of sleep  Discussed strategies for weight maintenance.    Also advised consistently targeting 8 hours or more sleep.  Follow-up is advised in 1 year or sooner if needed to monitor progress, compliance and to adjust therapy.      History of Present Illness   HPI         Follow-Up Note - Sleep Center   Paresh Phillips  74 y.o. male  :1950  MRN:[id number]  DOS:2025    CC: I saw this patient for follow-up in clinic today for Sleep disordered breathing, Coexisting Sleep and Medical Problems.. Interval changes: None reported.      Results of a split study in 2019: The diagnostic portion demonstrated an AHI of 40.5 per hour. There were also respiratory effort-related arousals resulting in an RDI of 43.5 per hour. Minimum oxygen saturation was 82% and 5.6% of the study  spent with saturations below 90%. During the therapeutic portion of the study, sleep disordered breathing was sufficiently remediated with BiPAP at of over 8 cm H2O. There were moderate periodic limb movements of sleep    PFSH, Problem List, Medications & Allergies were reviewed in EMR.   He  has a past medical history of Cancer (HCC), GERD (gastroesophageal reflux disease), Hearing aid worn, Heart disease, Hyperlipidemia, Hypertension, Nocturnal hypoxia, PAT (paroxysmal atrial tachycardia) ([facility]), Pneumonia (2016), PVC (premature ventricular contraction), and Tenosynovitis.    He has a current medication list which includes the following prescription(s): alfuzosin, aspirin, cholecalciferol, coenzyme q10, digox, folic acid, multivitamin, omeprazole, pindolol, ramipril, rosuvastatin, and saw palmetto.    PHYSIOLOGICAL DATA REVIEW : Using PAP > 4 hours/night 100%. Estimated ARIADNE 5.9/hour with pressure of 13/9cm H2O@90th/95th percentile;.  INTERPRETATION: Compliance is excellent; Pressure setting is:in acceptable range; ;     SUBJECTIVE: With respect to use of PAP, Paresh  is experiencing some adverse effects: dry mouth/throat and mask discomfort.He derives benefit.. Is satisfied with sleep and daytime function.     Sleep Routine: Paresh reports getting 6-8 hrs sleep; he has no difficulty initiating or maintaining sleep . He arises spontaneously and usually feels refreshed.Paresh]reports daytime sleepiness,.  He rated himself at Total score: 5 /24 on the Everson Sleepiness Scale.   Other issues: Periodic limb movements are not disturbing sleep..     Habits: Reports that he has never smoked. He has never used smokeless tobacco.,  Reports no history of alcohol use.,  Reports no history of drug use., Caffeine use: none until  ; Exercise routine: regular.      ROS: Significant for weight is stable within a few pounds..  Allergy symptoms are controlled with use of saline rinse.  Or cardiac symptoms.    EXAM: /60   Pulse  "(!) 45   Ht 5' 11\" (1.803 m)   Wt 84.4 kg (186 lb)   SpO2 98%   BMI 25.94 kg/m²     Wt Readings from Last 3 Encounters:   02/20/25 84.4 kg (186 lb)   12/16/24 82.6 kg (182 lb)   09/05/24 82.1 kg (181 lb)      Patient is well groomed; well appearing.   CNS: Alert, orientated, speech clear & coherent  Psych: cooperative and in no distress. Mental state: Appears normal.  H&N: EOMI; NC/AT: No facial pressure marks, no rashes.    Skin/Extrem: col & hydration normal; no edema  Resp: Respiratory effort is normal  Physical findings otherwise essentially unchanged from previous.    Sincerely,     Authenticated electronically on 02/20/25   Board Certified Specialist     Portions of the record may have been created with voice recognition software. Occasional wrong word or \"sound a like\" substitutions may have occurred due to the inherent limitations of voice recognition software. There may also be notations and random deletions of words or characters from malfunctioning software. Read the chart carefully and recognize, using context, where substitutions/deletions have occurred.      Sitting and reading: Slight chance of dozing  Watching TV: Moderate chance of dozing  Sitting, inactive in a public place (e.g. a theatre or a meeting): Moderate chance of dozing  As a passenger in a car for an hour without a break: Would never doze  Lying down to rest in the afternoon when circumstances permit: Would never doze  Sitting and talking to someone: Would never doze  Sitting quietly after a lunch without alcohol: Would never doze  In a car, while stopped for a few minutes in traffic: Would never doze  Total score: 5     Review of Systems  Pertinent positives/negatives included in HPI and also as noted:       Objective   /60   Pulse (!) 45   Ht 5' 11\" (1.803 m)   Wt 84.4 kg (186 lb)   SpO2 98%   BMI 25.94 kg/m²        Physical Exam    Data  Lab Results   Component Value Date    HGB 13.1 12/07/2022    HCT 39.8 12/07/2022    " "MCV 93 12/07/2022      Lab Results   Component Value Date    CALCIUM 9.1 06/10/2024    K 4.5 06/10/2024    CO2 31 06/10/2024     06/10/2024    BUN 14 06/10/2024    CREATININE 0.86 06/10/2024     No results found for: \"IRON\", \"TIBC\", \"FERRITIN\"  Lab Results   Component Value Date    AST 15 06/10/2024    ALT 15 06/10/2024         "

## 2025-02-21 ENCOUNTER — APPOINTMENT (OUTPATIENT)
Dept: LAB | Age: 75
End: 2025-02-21
Payer: COMMERCIAL

## 2025-02-21 DIAGNOSIS — N13.8 BPH WITH OBSTRUCTION/LOWER URINARY TRACT SYMPTOMS: ICD-10-CM

## 2025-02-21 DIAGNOSIS — N40.1 BPH WITH OBSTRUCTION/LOWER URINARY TRACT SYMPTOMS: ICD-10-CM

## 2025-02-21 LAB — PSA SERPL-MCNC: 2.04 NG/ML (ref 0–4)

## 2025-02-21 PROCEDURE — 84153 ASSAY OF PSA TOTAL: CPT

## 2025-02-21 PROCEDURE — 36415 COLL VENOUS BLD VENIPUNCTURE: CPT

## 2025-03-07 ENCOUNTER — OFFICE VISIT (OUTPATIENT)
Age: 75
End: 2025-03-07
Payer: COMMERCIAL

## 2025-03-07 VITALS
HEART RATE: 45 BPM | SYSTOLIC BLOOD PRESSURE: 122 MMHG | BODY MASS INDEX: 25.65 KG/M2 | OXYGEN SATURATION: 99 % | DIASTOLIC BLOOD PRESSURE: 60 MMHG | HEIGHT: 71 IN | WEIGHT: 183.2 LBS

## 2025-03-07 DIAGNOSIS — N40.1 BPH WITH OBSTRUCTION/LOWER URINARY TRACT SYMPTOMS: Primary | ICD-10-CM

## 2025-03-07 DIAGNOSIS — N13.8 BPH WITH OBSTRUCTION/LOWER URINARY TRACT SYMPTOMS: Primary | ICD-10-CM

## 2025-03-07 PROCEDURE — 99213 OFFICE O/P EST LOW 20 MIN: CPT | Performed by: PHYSICIAN ASSISTANT

## 2025-03-07 RX ORDER — ALFUZOSIN HYDROCHLORIDE 10 MG/1
10 TABLET, EXTENDED RELEASE ORAL DAILY
Qty: 90 TABLET | Refills: 3 | Status: SHIPPED | OUTPATIENT
Start: 2025-03-07

## 2025-03-10 NOTE — PROGRESS NOTES
UROLOGY PROGRESS NOTE   Patient Identifiers: Paresh Phillips (MRN [id number])  Date of Service: 3/10/2025    Subjective:   74-year-old man history of BPH.  He has been stable on alfuzosin.  PSA 2.03.    Reason for visit: BPH follow-up    Objective:     VITALS:    Vitals:    03/07/25 1020   BP: 122/60   Pulse: (!) 45   SpO2: 99%           LABS:  Lab Results   Component Value Date    HGB 13.1 12/07/2022    HCT 39.8 12/07/2022    WBC 5.34 12/07/2022     12/07/2022   ]    Lab Results   Component Value Date    K 4.5 06/10/2024     06/10/2024    CO2 31 06/10/2024    BUN 14 06/10/2024    CREATININE 0.86 06/10/2024    CALCIUM 9.1 06/10/2024   ]        INPATIENT MEDS:    Current Outpatient Medications:     alfuzosin (UROXATRAL) 10 mg 24 hr tablet, Take 1 tablet (10 mg total) by mouth daily, Disp: 90 tablet, Rfl: 3    aspirin (ECOTRIN LOW STRENGTH) 81 mg EC tablet, Take 81 mg by mouth daily at bedtime , Disp: , Rfl:     Cholecalciferol 2000 units CAPS, Take 1 capsule by mouth daily in the early morning , Disp: , Rfl:     Coenzyme Q10 (CO Q10 PO), Take 1 capsule by mouth daily in the early morning, Disp: , Rfl:     Digox 125 MCG tablet, Take 1 tablet (125 mcg total) by mouth daily in the early morning, Disp: 90 tablet, Rfl: 3    FOLIC ACID PO, Take 400 mcg by mouth daily in the early morning , Disp: , Rfl:     multivitamin (THERAGRAN) TABS, Take 1 tablet by mouth daily in the early morning , Disp: , Rfl:     omeprazole (PriLOSEC) 40 MG capsule, Take 40 mg by mouth daily in the early morning 1 po qd, Disp: , Rfl:     pindolol (VISKEN) 5 mg tablet, Take 1 tablet (5 mg total) by mouth 2 (two) times a day, Disp: 180 tablet, Rfl: 3    ramipril (ALTACE) 5 mg capsule, Take 1 capsule (5 mg total) by mouth daily at bedtime, Disp: 90 capsule, Rfl: 3    rosuvastatin (CRESTOR) 10 MG tablet, Take 1 tablet (10 mg total) by mouth daily at bedtime, Disp: 90 tablet, Rfl: 3    Saw Palmetto 450 MG CAPS, Take 1 capsule by mouth daily at  "bedtime 1 po qd , Disp: , Rfl:       Physical Exam:   /60 (BP Location: Left arm, Patient Position: Sitting, Cuff Size: Large)   Pulse (!) 45   Ht 5' 11\" (1.803 m)   Wt 83.1 kg (183 lb 3.2 oz)   SpO2 99%   BMI 25.55 kg/m²   GEN: no acute distress    RESP: breathing comfortably with no accessory muscle use    ABD: soft, non-tender, non-distended   INCISION:    EXT: no significant peripheral edema   (Male): Penis circumcised, phallus normal, meatus patent.  Testicles descended into scrotum bilaterally without masses nor tenderness.  No inguinal hernias bilaterally.  STAN: Prostate is enlarged at 40 grams. The prostate is not boggy. The prostate is not tender.  No nodules noted      RADIOLOGY:   none     Assessment:   #1.  BPH    Plan:   -Follow-up 1 year with PSA prior to visit  -  -  -          "

## 2025-06-13 ENCOUNTER — APPOINTMENT (OUTPATIENT)
Dept: LAB | Age: 75
End: 2025-06-13
Attending: INTERNAL MEDICINE
Payer: COMMERCIAL

## 2025-06-13 DIAGNOSIS — I25.10 CORONARY ARTERY DISEASE INVOLVING NATIVE CORONARY ARTERY OF NATIVE HEART WITHOUT ANGINA PECTORIS: ICD-10-CM

## 2025-06-13 LAB
ALBUMIN SERPL BCG-MCNC: 4 G/DL (ref 3.5–5)
ALP SERPL-CCNC: 76 U/L (ref 34–104)
ALT SERPL W P-5'-P-CCNC: 13 U/L (ref 7–52)
ANION GAP SERPL CALCULATED.3IONS-SCNC: 7 MMOL/L (ref 4–13)
AST SERPL W P-5'-P-CCNC: 16 U/L (ref 13–39)
BASOPHILS # BLD AUTO: 0.03 THOUSANDS/ÂΜL (ref 0–0.1)
BASOPHILS NFR BLD AUTO: 1 % (ref 0–1)
BILIRUB SERPL-MCNC: 0.75 MG/DL (ref 0.2–1)
BUN SERPL-MCNC: 16 MG/DL (ref 5–25)
CALCIUM SERPL-MCNC: 8.9 MG/DL (ref 8.4–10.2)
CHLORIDE SERPL-SCNC: 104 MMOL/L (ref 96–108)
CHOLEST SERPL-MCNC: 97 MG/DL (ref ?–200)
CO2 SERPL-SCNC: 30 MMOL/L (ref 21–32)
CREAT SERPL-MCNC: 0.77 MG/DL (ref 0.6–1.3)
EOSINOPHIL # BLD AUTO: 0.16 THOUSAND/ÂΜL (ref 0–0.61)
EOSINOPHIL NFR BLD AUTO: 3 % (ref 0–6)
ERYTHROCYTE [DISTWIDTH] IN BLOOD BY AUTOMATED COUNT: 15.8 % (ref 11.6–15.1)
GFR SERPL CREATININE-BSD FRML MDRD: 88 ML/MIN/1.73SQ M
GLUCOSE P FAST SERPL-MCNC: 86 MG/DL (ref 65–99)
HCT VFR BLD AUTO: 38 % (ref 36.5–49.3)
HDLC SERPL-MCNC: 49 MG/DL
HGB BLD-MCNC: 12.3 G/DL (ref 12–17)
IMM GRANULOCYTES # BLD AUTO: 0.02 THOUSAND/UL (ref 0–0.2)
IMM GRANULOCYTES NFR BLD AUTO: 0 % (ref 0–2)
LDLC SERPL CALC-MCNC: 40 MG/DL (ref 0–100)
LYMPHOCYTES # BLD AUTO: 1.26 THOUSANDS/ÂΜL (ref 0.6–4.47)
LYMPHOCYTES NFR BLD AUTO: 24 % (ref 14–44)
MCH RBC QN AUTO: 28.8 PG (ref 26.8–34.3)
MCHC RBC AUTO-ENTMCNC: 32.4 G/DL (ref 31.4–37.4)
MCV RBC AUTO: 89 FL (ref 82–98)
MONOCYTES # BLD AUTO: 0.67 THOUSAND/ÂΜL (ref 0.17–1.22)
MONOCYTES NFR BLD AUTO: 13 % (ref 4–12)
NEUTROPHILS # BLD AUTO: 3.18 THOUSANDS/ÂΜL (ref 1.85–7.62)
NEUTS SEG NFR BLD AUTO: 59 % (ref 43–75)
NRBC BLD AUTO-RTO: 0 /100 WBCS
PLATELET # BLD AUTO: 175 THOUSANDS/UL (ref 149–390)
PMV BLD AUTO: 10.5 FL (ref 8.9–12.7)
POTASSIUM SERPL-SCNC: 4.1 MMOL/L (ref 3.5–5.3)
PROT SERPL-MCNC: 6.6 G/DL (ref 6.4–8.4)
RBC # BLD AUTO: 4.27 MILLION/UL (ref 3.88–5.62)
SODIUM SERPL-SCNC: 141 MMOL/L (ref 135–147)
TRIGL SERPL-MCNC: 42 MG/DL (ref ?–150)
WBC # BLD AUTO: 5.32 THOUSAND/UL (ref 4.31–10.16)

## 2025-06-13 PROCEDURE — 85025 COMPLETE CBC W/AUTO DIFF WBC: CPT

## 2025-06-13 PROCEDURE — 80053 COMPREHEN METABOLIC PANEL: CPT

## 2025-06-13 PROCEDURE — 36415 COLL VENOUS BLD VENIPUNCTURE: CPT

## 2025-06-13 PROCEDURE — 80061 LIPID PANEL: CPT

## 2025-06-20 ENCOUNTER — ESTABLISHED COMPREHENSIVE EXAM (OUTPATIENT)
Dept: URBAN - METROPOLITAN AREA CLINIC 6 | Facility: CLINIC | Age: 75
End: 2025-06-20

## 2025-06-20 DIAGNOSIS — Z96.1: ICD-10-CM

## 2025-06-20 DIAGNOSIS — H43.813: ICD-10-CM

## 2025-06-20 PROCEDURE — 92014 COMPRE OPH EXAM EST PT 1/>: CPT

## 2025-06-20 ASSESSMENT — VISUAL ACUITY
OU_CC: J1+
OS_CC: 20/20-1
OD_CC: 20/20-1

## 2025-06-20 ASSESSMENT — TONOMETRY
OD_IOP_MMHG: 9
OS_IOP_MMHG: 9

## (undated) DEVICE — GLOVE SRG BIOGEL ECLIPSE 7

## (undated) DEVICE — SUT VICRYL 2-0 REEL 54 IN J286G

## (undated) DEVICE — ADHESIVE SKN CLSR HISTOACRYL FLEX 0.5ML LF

## (undated) DEVICE — BETHLEHEM UNIVERSAL MINOR GEN: Brand: CARDINAL HEALTH

## (undated) DEVICE — SUT VICRYL 0 CT-1 27 IN J260H

## (undated) DEVICE — SUT VICRYL 3-0 SH 27 IN J416H

## (undated) DEVICE — CHLORAPREP HI-LITE 26ML ORANGE

## (undated) DEVICE — DRAPE EQUIPMENT RF WAND

## (undated) DEVICE — INTENDED FOR TISSUE SEPARATION, AND OTHER PROCEDURES THAT REQUIRE A SHARP SURGICAL BLADE TO PUNCTURE OR CUT.: Brand: BARD-PARKER SAFETY BLADES SIZE 15, STERILE

## (undated) DEVICE — NEEDLE 22 G X 1 1/2 SAFETY

## (undated) DEVICE — LIGHT HANDLE COVER SLEEVE DISP BLUE STELLAR

## (undated) DEVICE — VIAL DECANTER

## (undated) DEVICE — SUT VICRYL 1 CT-1 27 IN J261H

## (undated) DEVICE — SUT PDS II 3-0 SH 27 IN Z316H

## (undated) DEVICE — SUT MONOCRYL 4-0 PS-2 18 IN Y496G